# Patient Record
Sex: FEMALE
[De-identification: names, ages, dates, MRNs, and addresses within clinical notes are randomized per-mention and may not be internally consistent; named-entity substitution may affect disease eponyms.]

---

## 2017-12-19 ENCOUNTER — HOSPITAL ENCOUNTER (INPATIENT)
Dept: HOSPITAL 14 - H.OPSURG | Age: 82
LOS: 2 days | Discharge: NURSING, REHAB OR LONG-TERM CARE | DRG: 470 | End: 2017-12-21
Attending: INTERNAL MEDICINE | Admitting: INTERNAL MEDICINE
Payer: MEDICARE

## 2017-12-19 VITALS — BODY MASS INDEX: 26.6 KG/M2

## 2017-12-19 DIAGNOSIS — M16.11: Primary | ICD-10-CM

## 2017-12-19 DIAGNOSIS — R53.1: ICD-10-CM

## 2017-12-19 DIAGNOSIS — F41.9: ICD-10-CM

## 2017-12-19 DIAGNOSIS — E55.9: ICD-10-CM

## 2017-12-19 DIAGNOSIS — E78.00: ICD-10-CM

## 2017-12-19 DIAGNOSIS — E11.65: ICD-10-CM

## 2017-12-19 DIAGNOSIS — I10: ICD-10-CM

## 2017-12-19 DIAGNOSIS — M54.5: ICD-10-CM

## 2017-12-19 LAB
ERYTHROCYTE [DISTWIDTH] IN BLOOD BY AUTOMATED COUNT: 14.6 % (ref 11.5–14.5)
HCT VFR BLD CALC: 31.5 % (ref 34–47)
MCH RBC QN AUTO: 28.6 PG (ref 27–31)
MCHC RBC AUTO-ENTMCNC: 32.3 G/DL (ref 33–37)
MCV RBC AUTO: 88.5 FL (ref 81–99)
PLATELET # BLD: 271 K/UL (ref 130–400)
WBC # BLD AUTO: 12.1 K/UL (ref 4.8–10.8)

## 2017-12-19 PROCEDURE — 0SR903Z REPLACEMENT OF RIGHT HIP JOINT WITH CERAMIC SYNTHETIC SUBSTITUTE, OPEN APPROACH: ICD-10-PCS | Performed by: SPECIALIST

## 2017-12-19 RX ADMIN — INSULIN LISPRO SCH UNITS: 100 INJECTION, SUSPENSION SUBCUTANEOUS at 18:06

## 2017-12-19 RX ADMIN — INSULIN LISPRO SCH UNITS: 100 INJECTION, SOLUTION INTRAVENOUS; SUBCUTANEOUS at 22:24

## 2017-12-19 RX ADMIN — INSULIN DETEMIR SCH UNITS: 100 INJECTION, SOLUTION SUBCUTANEOUS at 22:24

## 2017-12-19 RX ADMIN — OXYCODONE HYDROCHLORIDE SCH: 10 TABLET, FILM COATED, EXTENDED RELEASE ORAL at 23:57

## 2017-12-19 NOTE — OP
PROCEDURE DATE:  12/19/2017



SURGEON:  Olga Chavez MD



ASSISTANT:  Lee Edwards PA-C



PREOPERATIVE DIAGNOSIS:  Right hip osteoarthritis.



POSTOPERATIVE DIAGNOSIS:  Right hip osteoarthritis.



PROCEDURE:  Right total hip replacement.



IMPLANTS SIZE:  [place] 54-mm Tritanium cup, a 10-degree polyethylene

liner, size 6 high-offset stem, 36-mm ceramic head with +5 neck length.



ANESTHESIA TYPE:  Spinal and sedation.



ESTIMATED BLOOD LOSS:  100 mL.



COMPLICATIONS:  None.



HISTORY:  Patient with long standing history of right hip pain refractory

of conservative management.  X-ray had shown significant loss of joint

space.  After the failure of extensive conservative management, I had

offered the patient the treatment option of total hip replacement.  I

reviewed the risk and benefits of the surgery with the patient in detail. 

The risks include, but not limited to bleeding, infection, nerve vessel

damage, continuous pain, blood loss, instability, dislocation, iatrogenic

fracture, blood clots, need for further surgery, and even death.  The

patient fully understood the risks and benefits and opted to proceed. 

Patient underwent necessary preoperative medical workup and once medically

cleared, was scheduled for the procedure.



DESCRIPTION OF PROCEDURE:

On the day of the surgery, the patient was admitted to preoperative holding

area.  A laterality sheet was completed, confirming correct operative site.

An informed consent was signed from the patient and the correct operative

hip was marked.  Patient was brought into the operating room table.  She

underwent sedation with spinal anesthesia.



Afterwards, the patient was placed on the operating room table in lateral 

decubitus position.  All the bony prominences were well padded and an 

axillary roll was also placed.  The hip was draped and prepped in the 

standard sterile manner.  Timeout was completed, confirming correct 

operative site.



We proceeded with Navigation assisted total hip replacement.  Two pins were

placed in the iliac crest to attach the antenna.  Additional checkpoint was

placed on the greater trochanter and on top of the acetabular roof.



We utilized a standard postero-lateral approach.  Using a #10 blade, a skin

incision was made.  The soft tissue dissection was taken down until the IT 

band fascia was identified incised along it's fibers.  The short external

rotators were exposed and excised with the capsule. It was tagged wit heavy

sutures preserved for a later repair.  Afterwards, the hip was dislocated

and proposed neck cut was made.



The Acetabulum was exposed using standard retractors. The remnant of torn

labrum was excised along with pulvinar. The acetabulum was reamed using

motorized reamers to the size that provide adequate depth and coverage.



Next, size 54 cup was securely fixed in to the acetabular socket in

appropriate version and inclination.  A polyethylene liner was secured in

to the acetabular cup.



The femoral canal was exposed using standard retractors. Using the box

chisel, lateral femoral neck was removed. Femoral canal was broached up to

size 6 stem, which provide a secure fit and adequate fill of femoral canal.



A high-offset trial neck was secured onto the broach.



Different trial heads with variable neck lengths were secured onto the

trial neck.  The hip was reduced and taken through extensive range of

motion to test stability, soft tissue tensioning and leg length.  It was

noted the 36-mm head with +5 neck length provide adequate stability, soft

tissue tensioning and length.



Next, size 6 stem with high-offset was securely fixed into the femoral

canal.  Then, 36-mm ceramic head with +5 neck length was secured onto the

neck of femoral stem.  Hip was reduced and taken through final range of

motion to assess for stability, soft tissue tensioning and leg length which

was found to be satisfactory.



Finally, the wound was copiously irrigated using pulse lavage solution. 

All loose bodies were removed.  The short external rotators were repaired

to greater trochanter using drill holes and heavy sutures.  IT band fascia

was tightly closed using heavy sutures and rest the wound was closed

standard manner.



Sterile dressing was applied.  Patient was transferred to stretcher. 

Post-op instructions included posterior hip precautions.



During this procedure, I was assisted by Lee Edwards PA-C, who assisted

in positioning the patient on the operating room table as well as

transferring the patient from the operating room table to the recovery room

stretcher.  In addition, Lee Edwards PA-C assisted me during the actual

operative procedure by positioning, protecting critical neurovascular

structures, exposure of the joint, and proper positioning of the implants.

The presence of Lee Edwards PA-C as my operative assistant was

medically necessary to ensure the utmost safety of the patient in the pre,

intra-, and post-operative periods.





__________________________________________

Imran Scott, MD



DD:  12/19/2017 11:09:45

DT:  12/19/2017 12:22:48

Job # [id number]

## 2017-12-19 NOTE — PCM.SURG1
Surgeon's Initial Post Op Note





- Surgeon's Notes


Surgeon: Olga Chavez MD 


Assistant: Serge Jang MD; Lee Edwards PA-C 


Type of Anesthesia: General Endo, Spinal


Pre-Operative Diagnosis: Right Hip severe Osteoarthritis


Operative Findings: see op report


Post-Operative Diagnosis: same as pre-op dx


Operation Performed: Right total hip replacement


Specimen/Specimens Removed: right hip femoral head, soft tissue


Estimated Blood Loss: EBL {In ML}: 150


Date of Surgery/Procedure: 12/19/17


Time of Surgery/Procedure: 09:30

## 2017-12-19 NOTE — CP.PCM.HP
History of Present Illness





- History of Present Illness


History of Present Illness: 





S/P R THR.





81 y/o F, Multiple chronic medical conditions with scheduled admission to [facility] for OR on DOA 17, had R THR 2nd to O/A , Patient had constant 

moderate and at times severe pain in that site that has been experienced for 

years and gradually increased,  Pt was taking narcotics pain control, anti 

inflammatory drugs with some relief. Pt had CT R Hip at [facility] on 2017, showing degenerative changes R hip.





Worsening symptoms:  Uncontrolled DM, weakness, anxiety.





Aggravated factor:  Difficulty to walk.





Pt denied:  Fever, chills, n/v/d, abdominal pain, CP, SOB, palpitations, 

numbness, sick contact.























Present on Admission





- Present on Admission


Any Indicators Present on Admission: Yes


History of Uncontrolled Diabetes: Yes





Review of Systems





- Constitutional


Constitutional: Other (negative)





- EENT


Eyes: Other (negative)


Ears: Other (negative)


Nose/Mouth/Throat: Other (negative)





- Cardiovascular


Cardiovascular: Other (negative)





- Respiratory


Respiratory: Other (negative)





- Gastrointestinal


Gastrointestinal: Other (negative)





- Genitourinary


Genitourinary: Other (negative)





- Musculoskeletal


Musculoskeletal: Arthralgias, Back Pain


Additional comments: 








Left Hip pain





- Integumentary


Integumentary: Other (negative)





- Neurological


Neurological: Other (negative)





- Psychiatric


Psychiatric: Anxiety





- Endocrine


Endocrine: Other (negative)





- Hematologic/Lymphatic


Hematologic: Other (negative)





Past Patient History





- Infectious Disease


Hx of Infectious Diseases: None





- Past Medical History & Family History


Past Medical History?: Yes


Pertinent Family History: 





one  son OA with  Hx of  TKR





- Past Social History


Smoking Status: Never Smoked


Alcohol: None


Drugs: Denies


Home Situation {Lives}: With Family





- CARDIAC


Hx Cardiac Disorders: Yes


Hx Hypercholesterolemia: Yes (Patient  can not  take  Statins due  to myalgias.)


Hx Hypertension: Yes





- PULMONARY


Hx Respiratory Disorders: No


Other/Comment: Hx  Allergic  Rhinitis





- NEUROLOGICAL


HX Cerebrovascular Accident: Yes





- HEENT


Hx HEENT Problems: Yes


Hx Cataracts: Yes





- RENAL


Hx Chronic Kidney Disease: No


Other/Comment: chronic urinary infections





- ENDOCRINE/METABOLIC


Hx Endocrine Disorders: Yes


Hx Diabetes Mellitus Type 2: Yes





- HEMATOLOGICAL/ONCOLOGICAL


Hx Blood Disorders: No


Other/Comment: Arterial  embolism L Subclavian L Braquial 10-18-16 ( Patient 

took  Coumadin for   6 months ,then DC)





- INTEGUMENTARY


Hx Dermatological Problems: No





- MUSCULOSKELETAL/RHEUMATOLOGICAL


Hx Musculoskeletal Disorders: Yes


Hx Arthritis: Yes


Hx Back Pain: Yes


Hx Osteoarthritis: Yes


Hx Unsteady Gait: Yes


Other/Comment: R HIP PAIN . MUSCLE WEAKNESS .LIMIT JOINT MOTION





- GASTROINTESTINAL


Hx Gastrointestinal Disorders: Yes


Hx Gastritis: Yes (Hiatus  Hernia)





- GENITOURINARY/GYNECOLOGICAL


Hx Genitourinary Disorders: Yes


Hx Urinary Tract Infection: Yes





- PSYCHIATRIC


Hx Psychophysiologic Disorder: Yes


Hx Anxiety: Yes


Hx Emotional Abuse: No


Hx Physical Abuse: No


Hx Substance Use: No





- SURGICAL HISTORY


Hx Surgeries: Yes


Hx  Section: Yes (X2)


Hx Cholecystectomy: Yes


Other/Comment: ARTERIAL EMBOLECTOMY LEFT  BRAQUIAL 2016 ,  BLADDER 

SURGERY  , REMOVAL CYST RIGHT KNEE





- ANESTHESIA


Hx Anesthesia: Yes


Hx Anesthesia Reactions: No


Hx Malignant Hyperthermia: No


Has any member of the family had a problem w/ anesthesia?: No





Meds


Home Medications: 


 Home Medication List











 Medication  Instructions  Recorded  Confirmed  Type


 


Acetaminophen [Tylenol 325mg tab] 325 mg PO Q4 PRN  tab 17  Rx


 


Celecoxib [celeBREX] 100 mg PO Q12  cap 17  Rx


 


Enoxaparin [Lovenox] 40 mg SC DAILY  syr 17  Rx


 


oxyCODONE [oxyCONTIN Extended 10 mg PO Q12 #10 tab 17  Rx





Release Tab]    


 


oxyCODONE/Acetaminophen [Percocet 1 tab PO Q4 PRN #10 tab 17  Rx





5/325 mg Tab]    











Allergies/Adverse Reactions: 


 Allergies











Allergy/AdvReac Type Severity Reaction Status Date / Time


 


No Known Allergies Allergy   Verified 17 14:39














Physical Exam





- Constitutional


Appears: No Acute Distress





- Head Exam


Head Exam: NORMAL INSPECTION


Additional comments: 





L hand  weakness





- Eye Exam


Eye Exam: PERRL





- ENT Exam


ENT Exam: Normal Exam





- Neck Exam


Neck exam: Positive for: Normal Inspection





- Respiratory Exam


Respiratory Exam: NORMAL BREATHING PATTERN





- Cardiovascular Exam


Cardiovascular Exam: REGULAR RHYTHM, Systolic Murmur (1/6 LSB)





- GI/Abdominal Exam


GI & Abdominal Exam: Normal Bowel Sounds, Soft


Additional comments: 





Lower midline surgical scar healed, small midline supraumbilical hernia





- Extremities Exam


Additional comments: 





R hip dressing in place.,neuromuscular  status  distal good , area  of 

induration R hand  palm with tenderness , L hand  weakness





- Back Exam


Back exam: tenderness (L-S)





- Neurological Exam


Neurological exam: Alert, CN II-XII Intact, Oriented x3


Additional comments: 





neuromuscular status distal  RLE good, weakness l hand





- Psychiatric Exam


Psychiatric exam: Anxious





- Skin


Skin Exam: Warm





Results





- Vital Signs


Recent Vital Signs: 





 Last Vital Signs











Temp  97.4 F L  17 15:19


 


Pulse  70   17 15:00


 


Resp  20   17 15:19


 


BP  122/68   17 15:19


 


Pulse Ox  92 L  17 15:19








reviewed J.P.





- Labs


Result Diagrams: 


 17 05:35





 17 07:30


Labs: 





 Laboratory Results - last 24 hr











  17





  07:28 07:28 08:13


 


POC Glucose (mg/dL)    208 H


 


Blood Type  Cancelled  O POSITIVE 


 


Antibody Screen  Cancelled  Negative 


 


Crossmatch   See Detail 


 


BBK History Checked  Cancelled  Patient has bt 














  17





  12:40 15:41


 


POC Glucose (mg/dL)  257 H  260 H


 


Blood Type  


 


Antibody Screen  


 


Crossmatch  


 


BBK History Checked  








reviewed J.P.





Assessment & Plan


(1) Status post total hip replacement, right


Status: Acute   





(2) Uncontrolled type II diabetes mellitus


Status: Acute   Priority: High   





(3) Lumbago


Status: Chronic   Priority: Medium   





(4) HTN (hypertension)


Status: Chronic   Priority: Medium   





(5) History of arterial embolism


Status: Chronic   


Comment: Left  upper  extremity   





- Assessment and Plan (Free Text)


Plan: 


F/U Abd/Pelv X-Ray.  Continue Oxycodone, Humalog mix 75/25, Levemir and rest of 

medications, PT,OT eval.





- Date & Time


Date: 17


Time: 12:40

## 2017-12-20 LAB
BASOPHILS # BLD AUTO: 0 K/UL (ref 0–0.2)
BASOPHILS NFR BLD: 0.4 % (ref 0–2)
BUN SERPL-MCNC: 31 MG/DL (ref 7–17)
CALCIUM SERPL-MCNC: 7.6 MG/DL (ref 8.4–10.2)
CHLORIDE SERPL-SCNC: 104 MMOL/L (ref 98–107)
CO2 SERPL-SCNC: 24 MMOL/L (ref 22–30)
EOSINOPHIL # BLD AUTO: 0.1 K/UL (ref 0–0.7)
EOSINOPHIL NFR BLD: 1.1 % (ref 0–4)
ERYTHROCYTE [DISTWIDTH] IN BLOOD BY AUTOMATED COUNT: 14.6 % (ref 11.5–14.5)
GLUCOSE SERPL-MCNC: 272 MG/DL (ref 65–105)
HCT VFR BLD CALC: 29.3 % (ref 34–47)
LYMPHOCYTES # BLD AUTO: 1.5 K/UL (ref 1–4.3)
LYMPHOCYTES NFR BLD AUTO: 22.5 % (ref 20–40)
MCH RBC QN AUTO: 28.2 PG (ref 27–31)
MCHC RBC AUTO-ENTMCNC: 31.9 G/DL (ref 33–37)
MCV RBC AUTO: 88.6 FL (ref 81–99)
MONOCYTES # BLD: 0.6 K/UL (ref 0–0.8)
MONOCYTES NFR BLD: 9.9 % (ref 0–10)
NEUTROPHILS # BLD: 4.3 K/UL (ref 1.8–7)
NEUTROPHILS NFR BLD AUTO: 66.1 % (ref 50–75)
NRBC BLD AUTO-RTO: 0 % (ref 0–0)
PLATELET # BLD: 222 K/UL (ref 130–400)
PMV BLD AUTO: 7.5 FL (ref 7.2–11.7)
POTASSIUM SERPL-SCNC: 4.6 MMOL/L (ref 3.6–5)
SODIUM SERPL-SCNC: 136 MMOL/L (ref 132–148)
WBC # BLD AUTO: 6.5 K/UL (ref 4.8–10.8)

## 2017-12-20 RX ADMIN — INSULIN LISPRO SCH UNITS: 100 INJECTION, SOLUTION INTRAVENOUS; SUBCUTANEOUS at 16:21

## 2017-12-20 RX ADMIN — INSULIN LISPRO SCH UNITS: 100 INJECTION, SUSPENSION SUBCUTANEOUS at 12:10

## 2017-12-20 RX ADMIN — ENOXAPARIN SODIUM SCH MG: 40 INJECTION SUBCUTANEOUS at 21:39

## 2017-12-20 RX ADMIN — INSULIN LISPRO SCH: 100 INJECTION, SOLUTION INTRAVENOUS; SUBCUTANEOUS at 21:25

## 2017-12-20 RX ADMIN — INSULIN LISPRO SCH UNITS: 100 INJECTION, SOLUTION INTRAVENOUS; SUBCUTANEOUS at 11:01

## 2017-12-20 RX ADMIN — INSULIN DETEMIR SCH UNITS: 100 INJECTION, SOLUTION SUBCUTANEOUS at 21:38

## 2017-12-20 RX ADMIN — OXYCODONE HYDROCHLORIDE SCH MG: 10 TABLET, FILM COATED, EXTENDED RELEASE ORAL at 21:31

## 2017-12-20 RX ADMIN — INSULIN LISPRO SCH UNITS: 100 INJECTION, SUSPENSION SUBCUTANEOUS at 08:50

## 2017-12-20 RX ADMIN — INSULIN LISPRO SCH UNITS: 100 INJECTION, SOLUTION INTRAVENOUS; SUBCUTANEOUS at 07:50

## 2017-12-20 RX ADMIN — INSULIN LISPRO SCH UNITS: 100 INJECTION, SUSPENSION SUBCUTANEOUS at 16:37

## 2017-12-20 RX ADMIN — OXYCODONE HYDROCHLORIDE SCH MG: 10 TABLET, FILM COATED, EXTENDED RELEASE ORAL at 08:58

## 2017-12-20 NOTE — CP.PCM.PN
Subjective





- Date & Time of Evaluation


Date of Evaluation: 12/20/17


Time of Evaluation: 11:20





- Subjective


Subjective: 





F/U  R THR


PO D # 1 : Mild  Post -Surgical Pain, R Hip, Patient  on pain medication





Objective





- Vital Signs/Intake and Output


Vital Signs (last 24 hours): 


 











Temp Pulse Resp BP Pulse Ox


 


 97.5 F L  97 H  20   137/68   100 


 


 12/20/17 09:00  12/20/17 11:17  12/20/17 09:00  12/20/17 09:00  12/20/17 11:17











- Medications


Medications: 


 Current Medications





Acetaminophen (Tylenol 325mg Tab)  325 mg PO Q4 PRN


   PRN Reason: pain1-3


Alprazolam (Xanax)  0.5 mg PO TID formerly [facility]


   Last Admin: 12/20/17 08:58 Dose:  0.5 mg


Aspirin (Ecotrin)  81 mg PO DAILY formerly [facility]


   Last Admin: 12/20/17 08:49 Dose:  81 mg


Celecoxib (Celebrex)  100 mg PO Q12 formerly [facility]


   Last Admin: 12/20/17 08:49 Dose:  100 mg


Enoxaparin Sodium (Lovenox)  40 mg SC DAILY formerly [facility]


   PRN Reason: Protocol


Gabapentin (Neurontin)  100 mg PO BID formerly [facility]


   Last Admin: 12/20/17 08:49 Dose:  100 mg


Home Med (Cholecalciferol (Vitamin D3) [Vitamin D3])  50,000 units PO ASDIR formerly [facility]


Sodium Chloride (Sodium Chloride 0.45%)  1,000 mls @ 60 mls/hr IV .[id number] formerly [facility]


   Stop: 12/21/17 13:57


Insulin Detemir (Levemir)  12 units SC HS formerly [facility]


   Last Admin: 12/19/17 22:24 Dose:  12 units


Insulin Human Lispro (Humalog)  0 units SC ACHS formerly [facility]


   PRN Reason: Protocol


   Last Admin: 12/20/17 11:01 Dose:  12 units


Insulin Lispro Protam/Lispro Human (Humalog Mix 75/25)  15 units SC TID formerly [facility]


   Last Admin: 12/20/17 12:10 Dose:  15 units


Ketorolac Tromethamine (Toradol)  15 mg IM Q8 formerly [facility]


   Stop: 12/21/17 09:01


   Last Admin: 12/20/17 08:51 Dose:  15 mg


Oxycodone HCl (Oxycontin Extended Release Tab)  10 mg PO Q12 formerly [facility]


   Stop: 01/02/18 21:01


   Last Admin: 12/20/17 08:58 Dose:  10 mg


Oxycodone/Acetaminophen (Percocet 5/325 Mg Tab)  1 tab PO Q4 PRN


   PRN Reason: pain4-6


   Stop: 12/22/17 11:51











- Labs


Labs: 


 





 12/20/17 06:05 





 12/20/17 06:05 











- Constitutional


Appears: No Acute Distress





- Head Exam


Head Exam: NORMAL INSPECTION





- Eye Exam


Eye Exam: PERRL





- ENT Exam


ENT Exam: Normal Exam





- Neck Exam


Neck Exam: Normal Inspection





- Respiratory Exam


Respiratory Exam: NORMAL BREATHING PATTERN





- Cardiovascular Exam


Cardiovascular Exam: REGULAR RHYTHM





- GI/Abdominal Exam


GI & Abdominal Exam: Soft, Normal Bowel Sounds


Additional comments: 





Lower midline surgical scar healed.





- Extremities Exam


Additional comments: 


R hip dressing in place , mild  tenderness ,  neuromuscular status  distal good 

L hand  area of induration R hand  palm with tenderness





- Back Exam


Back Exam: tenderness (mild L-S)





- Neurological Exam


Neurological Exam: Alert, Oriented x3


Additional comments: 


 neuromuscular  status RLE good, weakness  L hand





- Psychiatric Exam


Psychiatric exam: Anxious





- Skin


Skin Exam: Warm





Assessment and Plan


(1) Status post total hip replacement, left


Status: Acute   





(2) Uncontrolled type II diabetes mellitus


Status: Acute   





(3) HTN (hypertension)


Status: Chronic   





(4) Lumbago


Status: Chronic   





(5) Vitamin D deficiency


Status: Acute   





(6) History of arterial embolism


Status: Chronic   





- Assessment and Plan (Free Text)


Plan: 





Continue current  treatment  , Oxycodone , Percocet , Insulin and  rest  of  

treatment , pain and BS control , PT

## 2017-12-20 NOTE — CP.PCM.PN
Subjective





- Date & Time of Evaluation


Date of Evaluation: 12/20/17


Time of Evaluation: 08:30





- Subjective


Subjective: 





S/P RTHR POD#1


Pt seen and examined at bedside, comfortable in bed 


Pt c/o mild right hip pain


Pt denies any SOB, chest pain, N/V/D, numbness/tingling RLE








Objective





- Vital Signs/Intake and Output


Vital Signs (last 24 hours): 


 











Temp Pulse Resp BP Pulse Ox


 


 97.5 F L  84   18   110/60   100 


 


 12/20/17 07:42  12/20/17 07:42  12/20/17 07:42  12/20/17 07:42  12/20/17 07:42











- Medications


Medications: 


 Current Medications





Acetaminophen (Tylenol 325mg Tab)  325 mg PO Q4 PRN


   PRN Reason: pain1-3


Alprazolam (Xanax)  0.5 mg PO TID [facility]


   Last Admin: 12/20/17 08:58 Dose:  0.5 mg


Aspirin (Ecotrin)  81 mg PO DAILY [facility]


   Last Admin: 12/20/17 08:49 Dose:  81 mg


Celecoxib (Celebrex)  100 mg PO Q12 [facility]


   Last Admin: 12/20/17 08:49 Dose:  100 mg


Enoxaparin Sodium (Lovenox)  40 mg SC DAILY [facility]


   PRN Reason: Protocol


Gabapentin (Neurontin)  100 mg PO BID [facility]


   Last Admin: 12/20/17 08:49 Dose:  100 mg


Home Med (Cholecalciferol (Vitamin D3) [Vitamin D3])  50,000 units PO ASDIR [facility]


Lactated Ringer's (Lactated Ringer's)  1,000 mls @ 100 mls/hr IV .Q10H [facility]


   Last Admin: 12/19/17 21:34 Dose:  100 mls/hr


Insulin Detemir (Levemir)  12 units SC HS [facility]


   Last Admin: 12/19/17 22:24 Dose:  12 units


Insulin Human Lispro (Humalog)  0 units SC ACHS [facility]


   PRN Reason: Protocol


   Last Admin: 12/20/17 07:50 Dose:  8 units


Insulin Lispro Protam/Lispro Human (Humalog Mix 75/25)  15 units SC TID [facility]


   Last Admin: 12/20/17 08:50 Dose:  15 units


Ketorolac Tromethamine (Toradol)  15 mg IM Q8 [facility]


   Stop: 12/21/17 09:01


   Last Admin: 12/20/17 08:51 Dose:  15 mg


Oxycodone HCl (Oxycontin Extended Release Tab)  10 mg PO Q12 [facility]


   Stop: 01/02/18 21:01


   Last Admin: 12/20/17 08:58 Dose:  10 mg


Oxycodone/Acetaminophen (Percocet 5/325 Mg Tab)  1 tab PO Q4 PRN


   PRN Reason: pain4-6


   Stop: 12/22/17 11:51











- Labs


Labs: 


 





 12/20/17 06:05 





 12/20/17 06:05 











- Constitutional


Appears: Well, No Acute Distress





- Respiratory Exam


Respiratory Exam: Clear to Ausculation Bilateral, NORMAL BREATHING PATTERN





- Cardiovascular Exam


Cardiovascular Exam: REGULAR RHYTHM, RRR





- Extremities Exam


Additional comments: 





Right hip:


dressing C/D/I


Calves soft and nontender b/l


N/V intact distally


No foot drop


Distal pulses wnl 





Assessment and Plan





- Assessment and Plan (Free Text)


Assessment: 





81 yo F s/p RTHR POD#1


Plan: 





Pain Control


DVT ppx


Incentive Spirometer


PT/OT WBAT 


F/U labs 


Continue current managenment

## 2017-12-20 NOTE — RAD
PROCEDURE:  Right Hip Radiographs.



HISTORY:

s/p RTHR  



COMPARISON:

Right hip radiographs dated 12/11/2017.



FINDINGS:

The patient is status post total right hip arthroplasty with 

prosthetic components seen in good alignment. A small amount of 

expected air and fluid is seen adjacent to the surgical bed.  

Surgical staples are present superficially. There is stable narrowing 

of the left hip with subchondral sclerosis, cystic change and 

osteophytosis. The sacroiliac joints are degenerative. Evaluation the 

sacrum is limited by overlying bowel gas and stool. The ilioischial 

and iliopectineal lines are smooth.  The symphysis pubis is mildly 

degenerative. A calcified uterine fibroid is seen centrally. 



IMPRESSION:

Status post right hip arthroplasty.

## 2017-12-20 NOTE — PQF GENQUE
***** This form is a permanent part of the medical record*****



12/20/17 Dr. Saldaña,



H&P with [facility] template includes DM I and DM II. Accuchecks running [id number]. 
Treated with insulin.



Would you please clarify if this is DM I or DM II and whether it is controlled 
or uncontrolled with Hyperglycemia.

          

Clarification of your documentation is requested to better reflect the severity 
of illness and intensity of treatment of your patient.  



Indicators present      



[]  Specify: []

         



[]  Specify: []         

 



[]  Specify: []         





[]  Specify: []         





Location in the medical record that reflects the above clinical findings:  []

 



Treatment Provided:  []

  

PHYSICIAN'S RESPONSE





Based on your medical judgment of the clinical indicators outlined above please 
clarify the following:



[]  Practitioner response 



[]  If unable to determine, please check the box, sign and date.  





Present On Admission (POA) Indicator:





[] Present at the time of admission 



[] Not present at the time of admission



[] Clinically Undetermined









In responding to this query, please exercise your independent professional 
judgment.  The fact that a question is asked does not imply that any particular 
answer is desired or expected.  Thank you for your clarification on this 
documentation.



If you have any questions please call:[phone]

* Thank you,

   Anamaria Delvalle RN

   [facility]

## 2017-12-21 ENCOUNTER — HOSPITAL ENCOUNTER (INPATIENT)
Dept: HOSPITAL 14 - H.TCU | Age: 82
LOS: 13 days | Discharge: NURSING, REHAB OR LONG-TERM CARE | DRG: 560 | End: 2018-01-03
Attending: INTERNAL MEDICINE | Admitting: INTERNAL MEDICINE
Payer: COMMERCIAL

## 2017-12-21 VITALS
DIASTOLIC BLOOD PRESSURE: 58 MMHG | SYSTOLIC BLOOD PRESSURE: 100 MMHG | RESPIRATION RATE: 20 BRPM | OXYGEN SATURATION: 98 % | TEMPERATURE: 98.2 F | HEART RATE: 80 BPM

## 2017-12-21 VITALS — BODY MASS INDEX: 26.6 KG/M2

## 2017-12-21 DIAGNOSIS — Z47.1: Primary | ICD-10-CM

## 2017-12-21 DIAGNOSIS — T40.605A: ICD-10-CM

## 2017-12-21 DIAGNOSIS — R41.0: ICD-10-CM

## 2017-12-21 DIAGNOSIS — E86.9: ICD-10-CM

## 2017-12-21 DIAGNOSIS — E11.65: ICD-10-CM

## 2017-12-21 DIAGNOSIS — E66.3: ICD-10-CM

## 2017-12-21 DIAGNOSIS — E55.9: ICD-10-CM

## 2017-12-21 DIAGNOSIS — I69.398: ICD-10-CM

## 2017-12-21 DIAGNOSIS — R53.1: ICD-10-CM

## 2017-12-21 DIAGNOSIS — I10: ICD-10-CM

## 2017-12-21 DIAGNOSIS — D62: ICD-10-CM

## 2017-12-21 DIAGNOSIS — N17.9: ICD-10-CM

## 2017-12-21 DIAGNOSIS — Z96.641: ICD-10-CM

## 2017-12-21 LAB
BASOPHILS # BLD AUTO: 0 K/UL (ref 0–0.2)
BASOPHILS NFR BLD: 0.3 % (ref 0–2)
BUN SERPL-MCNC: 44 MG/DL (ref 7–17)
CALCIUM SERPL-MCNC: 7.6 MG/DL (ref 8.4–10.2)
CHLORIDE SERPL-SCNC: 100 MMOL/L (ref 98–107)
CO2 SERPL-SCNC: 22 MMOL/L (ref 22–30)
EOSINOPHIL # BLD AUTO: 0 K/UL (ref 0–0.7)
EOSINOPHIL NFR BLD: 0.1 % (ref 0–4)
ERYTHROCYTE [DISTWIDTH] IN BLOOD BY AUTOMATED COUNT: 14.8 % (ref 11.5–14.5)
GLUCOSE SERPL-MCNC: 272 MG/DL (ref 65–105)
HCT VFR BLD CALC: 32.2 % (ref 34–47)
LYMPHOCYTES # BLD AUTO: 1.2 K/UL (ref 1–4.3)
LYMPHOCYTES NFR BLD AUTO: 10.7 % (ref 20–40)
MCH RBC QN AUTO: 27.9 PG (ref 27–31)
MCHC RBC AUTO-ENTMCNC: 31.3 G/DL (ref 33–37)
MCV RBC AUTO: 89.3 FL (ref 81–99)
MONOCYTES # BLD: 0.8 K/UL (ref 0–0.8)
MONOCYTES NFR BLD: 6.9 % (ref 0–10)
NEUTROPHILS # BLD: 9 K/UL (ref 1.8–7)
NEUTROPHILS NFR BLD AUTO: 82 % (ref 50–75)
NRBC BLD AUTO-RTO: 0 % (ref 0–0)
PLATELET # BLD: 203 K/UL (ref 130–400)
PMV BLD AUTO: 8.5 FL (ref 7.2–11.7)
POTASSIUM SERPL-SCNC: 4.9 MMOL/L (ref 3.6–5)
SODIUM SERPL-SCNC: 132 MMOL/L (ref 132–148)
WBC # BLD AUTO: 11 K/UL (ref 4.8–10.8)

## 2017-12-21 PROCEDURE — F08Z0FZ BATHING/SHOWERING TECHNIQUES TREATMENT USING ASSISTIVE, ADAPTIVE, SUPPORTIVE OR PROTECTIVE EQUIPMENT: ICD-10-PCS | Performed by: INTERNAL MEDICINE

## 2017-12-21 PROCEDURE — F07Z5FZ BED MOBILITY TREATMENT USING ASSISTIVE, ADAPTIVE, SUPPORTIVE OR PROTECTIVE EQUIPMENT: ICD-10-PCS | Performed by: INTERNAL MEDICINE

## 2017-12-21 PROCEDURE — F07L6ZZ THERAPEUTIC EXERCISE TREATMENT OF MUSCULOSKELETAL SYSTEM - LOWER BACK / LOWER EXTREMITY: ICD-10-PCS | Performed by: INTERNAL MEDICINE

## 2017-12-21 PROCEDURE — F07Z9FZ GAIT TRAINING/FUNCTIONAL AMBULATION TREATMENT USING ASSISTIVE, ADAPTIVE, SUPPORTIVE OR PROTECTIVE EQUIPMENT: ICD-10-PCS | Performed by: INTERNAL MEDICINE

## 2017-12-21 PROCEDURE — F08Z1FZ DRESSING TECHNIQUES TREATMENT USING ASSISTIVE, ADAPTIVE, SUPPORTIVE OR PROTECTIVE EQUIPMENT: ICD-10-PCS | Performed by: INTERNAL MEDICINE

## 2017-12-21 RX ADMIN — ERGOCALCIFEROL SCH: 1.25 CAPSULE ORAL at 16:42

## 2017-12-21 RX ADMIN — ENOXAPARIN SODIUM SCH MG: 40 INJECTION SUBCUTANEOUS at 09:18

## 2017-12-21 RX ADMIN — INSULIN LISPRO SCH UNITS: 100 INJECTION, SUSPENSION SUBCUTANEOUS at 13:00

## 2017-12-21 RX ADMIN — INSULIN LISPRO SCH UNITS: 100 INJECTION, SOLUTION INTRAVENOUS; SUBCUTANEOUS at 12:00

## 2017-12-21 RX ADMIN — INSULIN LISPRO SCH UNITS: 100 INJECTION, SUSPENSION SUBCUTANEOUS at 09:15

## 2017-12-21 RX ADMIN — OXYCODONE HYDROCHLORIDE SCH MG: 10 TABLET, FILM COATED, EXTENDED RELEASE ORAL at 09:34

## 2017-12-21 RX ADMIN — INSULIN LISPRO SCH UNITS: 100 INJECTION, SOLUTION INTRAVENOUS; SUBCUTANEOUS at 09:14

## 2017-12-21 RX ADMIN — OXYCODONE HYDROCHLORIDE SCH: 10 TABLET, FILM COATED, EXTENDED RELEASE ORAL at 21:40

## 2017-12-21 RX ADMIN — INSULIN LISPRO SCH UNITS: 100 INJECTION, SUSPENSION SUBCUTANEOUS at 16:52

## 2017-12-21 RX ADMIN — INSULIN LISPRO SCH UNITS: 100 INJECTION, SUSPENSION SUBCUTANEOUS at 12:09

## 2017-12-21 NOTE — CP.PCM.PN
Subjective





- Date & Time of Evaluation


Date of Evaluation: 12/21/17


Time of Evaluation: 12:20





- Subjective


Subjective: 





F/U  R THR.


No AD , minimal pain  R Hip with pain medication





Objective





- Vital Signs/Intake and Output


Vital Signs (last 24 hours): 


 











Temp Pulse Resp BP Pulse Ox


 


 98.2 F   80   20   100/58 L  98 


 


 12/21/17 07:47  12/21/17 07:47  12/21/17 07:47  12/21/17 07:47  12/21/17 07:47











- Labs


Labs: 


 





 12/21/17 05:35 





 12/21/17 07:30 











- Constitutional


Appears: No Acute Distress





- Head Exam


Head Exam: NORMAL INSPECTION





- Eye Exam


Eye Exam: PERRL





- ENT Exam


ENT Exam: Normal Exam





- Neck Exam


Neck Exam: Normal Inspection





- Respiratory Exam


Respiratory Exam: NORMAL BREATHING PATTERN





- Cardiovascular Exam


Cardiovascular Exam: REGULAR RHYTHM





- GI/Abdominal Exam


GI & Abdominal Exam: Soft, Normal Bowel Sounds


Additional comments: 





Lower midline surgical scar healed.





- Extremities Exam


Additional comments: 





R hip dressing in place, neuromuscular  distal status  good , area  of  

induration R hand  palm with tenderness , L hand  weakness





- Back Exam


Back Exam: tenderness (L-S)





- Neurological Exam


Neurological Exam: Alert, Oriented x3


Additional comments: 





neurological satus distal  RLE good, weakness L hand





- Psychiatric Exam


Psychiatric exam: Anxious





- Skin


Skin Exam: Warm





Assessment and Plan


(1) Status post total hip replacement, right


Status: Acute   





(2) Uncontrolled type II diabetes mellitus


Status: Acute   





(3) HTN (hypertension)


Status: Chronic   





(4) Lumbago


Status: Chronic   





(5) Vitamin D deficiency


Status: Acute   





(6) History of arterial embolism


Status: Chronic   





- Assessment and Plan (Free Text)


Plan: 





Improved , stable  to be DD to TCU for continue  PT, see inst/med sheet, I will 

follow  Patient in TCU.

## 2017-12-22 RX ADMIN — OXYCODONE HYDROCHLORIDE SCH: 10 TABLET, FILM COATED, EXTENDED RELEASE ORAL at 09:02

## 2017-12-22 RX ADMIN — INSULIN LISPRO SCH UNITS: 100 INJECTION, SUSPENSION SUBCUTANEOUS at 16:15

## 2017-12-22 RX ADMIN — ENOXAPARIN SODIUM SCH MG: 40 INJECTION SUBCUTANEOUS at 09:01

## 2017-12-22 RX ADMIN — INSULIN LISPRO SCH UNITS: 100 INJECTION, SUSPENSION SUBCUTANEOUS at 10:00

## 2017-12-22 RX ADMIN — INSULIN LISPRO SCH UNITS: 100 INJECTION, SUSPENSION SUBCUTANEOUS at 13:00

## 2017-12-22 RX ADMIN — INSULIN DETEMIR SCH UNITS: 100 INJECTION, SOLUTION SUBCUTANEOUS at 21:52

## 2017-12-22 NOTE — CP.PCM.HP
History of Present Illness





- History of Present Illness


History of Present Illness: 


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  

CVA with LUE weakness had recent right total hip replacement ad transferred to 

TCU for physical therapy. At present patient feeling well, denies any pain or 

discomfort , denies chest pain SOB. She appears slightly confused .








Allergies : NKDA


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 

weakness


Medications : Novolog, Benecar, ASa, Xanax, Lovenox,Levemir


Surgery ; bladder surgery , right hip replacement , cholecystectomy, cyst 

removal


Family history ; son has OA and hip surgery


Social history ; Lives with daughter in [facility], denies smoking ,ETOH or 

drug abuse





PMD ; Dr. Piper Oglesby ; 14 point review of system negative except above











 











Present on Admission





- Present on Admission


Any Indicators Present on Admission: No


History of DVT/PE: Yes





Review of Systems





- Review of Systems


All systems: reviewed and no additional remarkable complaints except





Past Patient History





- Infectious Disease


Hx of Infectious Diseases: None





- Tetanus Immunizations


Tetanus Immunization: Unknown





- Past Medical History & Family History


Past Medical History?: Yes


Past Family History: Reviewed and not pertinent





- Past Social History


Smoking Status: Never Smoked


Chewing Tobacco Use: No


Cigar Use: No


Alcohol: None


Drugs: Denies


Home Situation {Lives}: With Family


Domestic Violence: Negative





- CARDIAC


Hx Cardiac Disorders: Yes


Hx Hypercholesterolemia: Yes (Patient  can not  take  Statins due  to myalgias.)


Hx Hypertension: Yes





- PULMONARY


Hx Respiratory Disorders: No


Other/Comment: Hx  Allergic  Rhinitis





- NEUROLOGICAL


HX Cerebrovascular Accident: Yes





- HEENT


Hx HEENT Problems: Yes


Hx Cataracts: Yes





- RENAL


Hx Chronic Kidney Disease: No


Other/Comment: chronic urinary infections





- ENDOCRINE/METABOLIC


Hx Endocrine Disorders: Yes


Hx Diabetes Mellitus Type 2: Yes





- HEMATOLOGICAL/ONCOLOGICAL


Hx Blood Disorders: No


Other/Comment: Arterial  embolism L Subclavian L Braquial 10-18-16 ( Patient 

took  Coumadin for   6 months ,then DC)





- INTEGUMENTARY


Hx Dermatological Problems: No





- MUSCULOSKELETAL/RHEUMATOLOGICAL


Hx Musculoskeletal Disorders: Yes


Hx Arthritis: Yes


Hx Back Pain: Yes


Hx Falls: No


Hx Osteoarthritis: Yes


Hx Unsteady Gait: Yes


Other/Comment: R HIP PAIN . MUSCLE WEAKNESS .LIMIT JOINT MOTION





- GASTROINTESTINAL


Hx Gastrointestinal Disorders: Yes


Hx Gastritis: Yes (Hiatus  Hernia)





- GENITOURINARY/GYNECOLOGICAL


Hx Genitourinary Disorders: Yes


Hx Urinary Tract Infection: Yes





- PSYCHIATRIC


Hx Psychophysiologic Disorder: Yes


Hx Anxiety: Yes


Hx Emotional Abuse: No


Hx Physical Abuse: No


Hx Substance Use: No





- SURGICAL HISTORY


Hx  Section: Yes


Hx Joint Replacement: Yes (right total hip replacement 17)





- ANESTHESIA


Hx Anesthesia: Yes


Hx Anesthesia Reactions: No


Hx Malignant Hyperthermia: No





Meds


Allergies/Adverse Reactions: 


 Allergies











Allergy/AdvReac Type Severity Reaction Status Date / Time


 


No Known Allergies Allergy   Verified 17 14:39














Physical Exam





- Constitutional


Appears: Non-toxic, No Acute Distress, Confused, Other (obese )





- Head Exam


Head Exam: ATRAUMATIC, NORMAL INSPECTION, NORMOCEPHALIC





- Eye Exam


Eye Exam: PERRL





- ENT Exam


ENT Exam: Mucous Membranes Moist, Normal Exam





- Neck Exam


Neck exam: Positive for: Full Rom, Normal Inspection





- Respiratory Exam


Respiratory Exam: Clear to Auscultation Bilateral, NORMAL BREATHING PATTERN.  

absent: Rales, Rhonchi, Wheezes





- Cardiovascular Exam


Cardiovascular Exam: REGULAR RHYTHM, RRR, +S1, +S2.  absent: JVD





- GI/Abdominal Exam


GI & Abdominal Exam: Normal Bowel Sounds, Soft.  absent: Distended, Guarding, 

Rebound, Tenderness





- Rectal Exam


Rectal Exam: Deferred





- Extremities Exam


Extremities exam: Positive for: full ROM, normal capillary refill, normal 

inspection, pedal pulses present.  Negative for: pedal edema


Additional comments: 





right hip surgical incision with dressing in place





- Neurological Exam


Neurological exam: Alert, CN II-XII Intact


Additional comments: 





LUE weakness





- Psychiatric Exam


Psychiatric exam: Normal Affect, Normal Mood





- Skin


Skin Exam: Pallor, Warm





Results





- Vital Signs


Recent Vital Signs: 





 Last Vital Signs











Temp  99.9 F H  17 15:56


 


Pulse  102 H  17 15:56


 


Resp  20   17 15:56


 


BP  125/61   17 15:56


 


Pulse Ox  91 L  17 15:56














- Labs


Labs: 





 Laboratory Results - last 24 hr











  17





  20:38 03:01 07:08


 


POC Glucose (mg/dL)  330 H  313 H  317 H














  17





  11:12 16:21


 


POC Glucose (mg/dL)  281 H  298 H














Assessment & Plan





- Assessment and Plan (Free Text)


Assessment: 


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  

CVA with LUE weakness had recent right total hip replacement ad transferred to 

TCU for physical therapy. At present patient feeling well, denies any pain or 

discomfort , denies chest pain SOB. She appears slightly confused .











1. Right Hip Osteoarthritis s/p THR


Admit to TCU 


PT / OT consult


pain is controlled. will d/c narcotics since patient appears somewhat confused


Lovenox for DVT prophylaxis








2. KATARZYNA 


creatinine trending up post op


Most likely volume depleted


Start IVF


repeat BMP





3. Acute blood Loss anemia from recent surgery


Hgb dropped from 13 -- 10 


Monitor for now








4. Uncontrolled DM 


Continue accuchecks, Insulin coverage , diabetic diet 


resume levemir and Novolog


check hgb A1C








5. HTN


controlled without meds 








6. History CVA with LUE weakness


on ASA


pateint can nottake statin due to myalgias








7.Altered Mental status / Confusion 


Most likely secondary to narcotis


D/c all narcotics


Start Tylenol





8. Overweight BMI 30








9. DVt prophylaxis


lovenox

## 2017-12-23 RX ADMIN — ENOXAPARIN SODIUM SCH MG: 40 INJECTION SUBCUTANEOUS at 10:00

## 2017-12-23 RX ADMIN — INSULIN LISPRO SCH UNITS: 100 INJECTION, SUSPENSION SUBCUTANEOUS at 17:19

## 2017-12-23 RX ADMIN — INSULIN LISPRO SCH UNITS: 100 INJECTION, SUSPENSION SUBCUTANEOUS at 09:58

## 2017-12-23 RX ADMIN — INSULIN DETEMIR SCH UNITS: 100 INJECTION, SOLUTION SUBCUTANEOUS at 21:32

## 2017-12-23 RX ADMIN — INSULIN LISPRO SCH UNITS: 100 INJECTION, SUSPENSION SUBCUTANEOUS at 12:23

## 2017-12-24 LAB
BACTERIA #/AREA URNS HPF: (no result) /[HPF]
BILIRUB UR-MCNC: NEGATIVE MG/DL
BUN SERPL-MCNC: 54 MG/DL (ref 7–17)
CALCIUM SERPL-MCNC: 7.5 MG/DL (ref 8.4–10.2)
COLOR UR: YELLOW
ERYTHROCYTE [DISTWIDTH] IN BLOOD BY AUTOMATED COUNT: 14.8 % (ref 11.5–14.5)
GFR NON-AFRICAN AMERICAN: 43
GLUCOSE UR STRIP-MCNC: (no result) MG/DL
HGB BLD-MCNC: 7.7 G/DL (ref 12–16)
LEUKOCYTE ESTERASE UR-ACNC: (no result) LEU/UL
MCH RBC QN AUTO: 28.3 PG (ref 27–31)
MCHC RBC AUTO-ENTMCNC: 32 G/DL (ref 33–37)
MCV RBC AUTO: 88.5 FL (ref 81–99)
PH UR STRIP: 6 [PH] (ref 5–8)
PLATELET # BLD: 259 K/UL (ref 130–400)
PROT UR STRIP-MCNC: NEGATIVE MG/DL
RBC # BLD AUTO: 2.73 MIL/UL (ref 3.8–5.2)
RBC # UR STRIP: (no result) /UL
SP GR UR STRIP: < 1.005 (ref 1–1.03)
SQUAMOUS EPITHIAL: 8 /HPF (ref 0–5)
URINE CLARITY: (no result)
URINE NITRATE: NEGATIVE
UROBILINOGEN UR-MCNC: (no result) MG/DL (ref 0.2–1)
WBC # BLD AUTO: 14.5 K/UL (ref 4.8–10.8)

## 2017-12-24 RX ADMIN — INSULIN LISPRO SCH UNITS: 100 INJECTION, SUSPENSION SUBCUTANEOUS at 08:53

## 2017-12-24 RX ADMIN — INSULIN DETEMIR SCH UNITS: 100 INJECTION, SOLUTION SUBCUTANEOUS at 21:50

## 2017-12-24 RX ADMIN — INSULIN LISPRO SCH UNITS: 100 INJECTION, SUSPENSION SUBCUTANEOUS at 12:32

## 2017-12-24 RX ADMIN — INSULIN LISPRO SCH UNITS: 100 INJECTION, SUSPENSION SUBCUTANEOUS at 17:10

## 2017-12-24 RX ADMIN — ENOXAPARIN SODIUM SCH MG: 40 INJECTION SUBCUTANEOUS at 10:06

## 2017-12-24 RX ADMIN — INSULIN DETEMIR SCH: 100 INJECTION, SOLUTION SUBCUTANEOUS at 22:00

## 2017-12-25 LAB
ERYTHROCYTE [DISTWIDTH] IN BLOOD BY AUTOMATED COUNT: 15.1 % (ref 11.5–14.5)
HGB BLD-MCNC: 8.4 G/DL (ref 12–16)
MCH RBC QN AUTO: 28.2 PG (ref 27–31)
MCHC RBC AUTO-ENTMCNC: 31.9 G/DL (ref 33–37)
MCV RBC AUTO: 88.2 FL (ref 81–99)
PLATELET # BLD: 344 K/UL (ref 130–400)
RBC # BLD AUTO: 2.99 MIL/UL (ref 3.8–5.2)
WBC # BLD AUTO: 13.1 K/UL (ref 4.8–10.8)

## 2017-12-25 RX ADMIN — ENOXAPARIN SODIUM SCH MG: 40 INJECTION SUBCUTANEOUS at 09:28

## 2017-12-25 RX ADMIN — INSULIN LISPRO SCH UNITS: 100 INJECTION, SUSPENSION SUBCUTANEOUS at 12:19

## 2017-12-25 RX ADMIN — INSULIN LISPRO SCH UNITS: 100 INJECTION, SUSPENSION SUBCUTANEOUS at 08:11

## 2017-12-25 RX ADMIN — ALUMINUM HYDROXIDE, MAGNESIUM HYDROXIDE, AND SIMETHICONE PRN ML: 200; 200; 20 SUSPENSION ORAL at 12:42

## 2017-12-25 RX ADMIN — INSULIN DETEMIR SCH UNITS: 100 INJECTION, SOLUTION SUBCUTANEOUS at 21:28

## 2017-12-25 RX ADMIN — INSULIN LISPRO SCH UNITS: 100 INJECTION, SUSPENSION SUBCUTANEOUS at 16:58

## 2017-12-26 RX ADMIN — INSULIN DETEMIR SCH UNITS: 100 INJECTION, SOLUTION SUBCUTANEOUS at 21:57

## 2017-12-26 RX ADMIN — ALUMINUM HYDROXIDE, MAGNESIUM HYDROXIDE, AND SIMETHICONE PRN ML: 200; 200; 20 SUSPENSION ORAL at 11:28

## 2017-12-26 RX ADMIN — INSULIN LISPRO SCH UNITS: 100 INJECTION, SUSPENSION SUBCUTANEOUS at 12:54

## 2017-12-26 RX ADMIN — INSULIN LISPRO SCH UNITS: 100 INJECTION, SUSPENSION SUBCUTANEOUS at 17:43

## 2017-12-26 RX ADMIN — INSULIN LISPRO SCH UNITS: 100 INJECTION, SUSPENSION SUBCUTANEOUS at 08:30

## 2017-12-26 RX ADMIN — ENOXAPARIN SODIUM SCH MG: 40 INJECTION SUBCUTANEOUS at 08:30

## 2017-12-26 RX ADMIN — ALUMINUM HYDROXIDE, MAGNESIUM HYDROXIDE, AND SIMETHICONE PRN ML: 200; 200; 20 SUSPENSION ORAL at 17:47

## 2017-12-27 LAB
BUN SERPL-MCNC: 21 MG/DL (ref 7–17)
CALCIUM SERPL-MCNC: 8 MG/DL (ref 8.4–10.2)
ERYTHROCYTE [DISTWIDTH] IN BLOOD BY AUTOMATED COUNT: 15 % (ref 11.5–14.5)
GFR NON-AFRICAN AMERICAN: > 60
HGB BLD-MCNC: 8.4 G/DL (ref 12–16)
MCH RBC QN AUTO: 28.5 PG (ref 27–31)
MCHC RBC AUTO-ENTMCNC: 32.3 G/DL (ref 33–37)
MCV RBC AUTO: 88.2 FL (ref 81–99)
PLATELET # BLD: 425 K/UL (ref 130–400)
RBC # BLD AUTO: 2.95 MIL/UL (ref 3.8–5.2)
WBC # BLD AUTO: 12.6 K/UL (ref 4.8–10.8)

## 2017-12-27 RX ADMIN — INSULIN LISPRO SCH UNITS: 100 INJECTION, SUSPENSION SUBCUTANEOUS at 16:29

## 2017-12-27 RX ADMIN — ENOXAPARIN SODIUM SCH MG: 40 INJECTION SUBCUTANEOUS at 09:46

## 2017-12-27 RX ADMIN — INSULIN DETEMIR SCH UNITS: 100 INJECTION, SOLUTION SUBCUTANEOUS at 21:22

## 2017-12-27 RX ADMIN — INSULIN LISPRO SCH UNITS: 100 INJECTION, SUSPENSION SUBCUTANEOUS at 12:20

## 2017-12-27 RX ADMIN — INSULIN LISPRO SCH UNITS: 100 INJECTION, SUSPENSION SUBCUTANEOUS at 09:46

## 2017-12-28 VITALS — RESPIRATION RATE: 20 BRPM

## 2017-12-28 RX ADMIN — INSULIN LISPRO SCH UNITS: 100 INJECTION, SUSPENSION SUBCUTANEOUS at 12:35

## 2017-12-28 RX ADMIN — ENOXAPARIN SODIUM SCH MG: 40 INJECTION SUBCUTANEOUS at 08:37

## 2017-12-28 RX ADMIN — INSULIN LISPRO SCH UNITS: 100 INJECTION, SUSPENSION SUBCUTANEOUS at 16:51

## 2017-12-28 RX ADMIN — Medication SCH APPLIC: at 16:50

## 2017-12-28 RX ADMIN — INSULIN LISPRO SCH UNITS: 100 INJECTION, SUSPENSION SUBCUTANEOUS at 08:36

## 2017-12-28 RX ADMIN — INSULIN DETEMIR SCH UNITS: 100 INJECTION, SOLUTION SUBCUTANEOUS at 21:28

## 2017-12-28 RX ADMIN — ERGOCALCIFEROL SCH CAP: 1.25 CAPSULE ORAL at 15:08

## 2017-12-28 NOTE — CP.PCM.PN
Subjective





- Date & Time of Evaluation


Date of Evaluation: 12/28/17


Time of Evaluation: 14:00





- Subjective


Subjective: 





Patient was seen and examined getting dressed with assistance in her 

wheelchair. She states she is doing well with therapies. Complaining of some 

mild right lower leg swelling but denies any pain or any other complaints. 





Objective





- Vital Signs/Intake and Output


Vital Signs (last 24 hours): 


 











Temp Pulse Resp BP Pulse Ox


 


 97.8 F   77   20   140/54 L  98 


 


 12/28/17 08:22  12/28/17 08:36  12/28/17 08:22  12/28/17 08:36  12/28/17 08:22











- Medications


Medications: 


 Current Medications





Acetaminophen (Tylenol 325mg Tab)  325 mg PO Q4 PRN


   PRN Reason: pain1-3


   Last Admin: 12/24/17 04:42 Dose:  325 mg


Al Hydrox/Mg Hydrox/Simethicone (Maalox Plus 30 Ml)  30 ml PO Q6 PRN


   PRN Reason: Indigestion / Heartburn


   Last Admin: 12/26/17 17:47 Dose:  30 ml


Alprazolam (Xanax)  0.5 mg PO HS [facility]


   Last Admin: 12/27/17 21:15 Dose:  0.5 mg


Aspirin (Ecotrin)  81 mg PO DAILY [facility]


   Last Admin: 12/28/17 08:37 Dose:  81 mg


Carvedilol (Coreg)  3.125 mg PO BID [facility]


   Last Admin: 12/28/17 08:36 Dose:  3.125 mg


Celecoxib (Celebrex)  100 mg PO Q12 [facility]


   Last Admin: 12/28/17 08:37 Dose:  100 mg


Dimethicone (Proshield Plus Skin Protectant)  1 applic TOP Q8 [facility]


Enoxaparin Sodium (Lovenox)  40 mg SC DAILY [facility]


   PRN Reason: Protocol


   Last Admin: 12/28/17 08:37 Dose:  40 mg


Ergocalciferol (Drisdol 50,000 Intl Units Cap)  1 cap PO Q7D [facility]


   Last Admin: 12/21/17 16:42 Dose:  Not Given


Famotidine (Pepcid)  20 mg PO DAILY [facility]


   Last Admin: 12/28/17 08:37 Dose:  20 mg


Gabapentin (Neurontin)  100 mg PO BID [facility]


   Last Admin: 12/28/17 08:37 Dose:  100 mg


Insulin Detemir (Levemir)  18 units SC HS [facility]


   Last Admin: 12/27/17 21:22 Dose:  18 units


Insulin Human Regular (Humulin R)  0 units SC ACHS [facility]


   PRN Reason: Protocol


   Last Admin: 12/28/17 11:06 Dose:  Not Given


Insulin Lispro Protam/Lispro Human (Humalog Mix 75/25)  22 units SC TID [facility]


Losartan Potassium (Cozaar)  50 mg PO DAILY [facility]


   Last Admin: 12/28/17 08:36 Dose:  50 mg


Tramadol HCl (Ultram)  50 mg PO Q6 PRN


   PRN Reason: Pain, severe (8-10)











- Labs


Labs: 


 





 12/27/17 06:00 





 12/27/17 06:00 











- Additional Findings


Additional findings: 





Physical exam:





Constitutional- cooperative, awake, alert. No apparent distress.


Head- NCAT, PERRL. 


Eye- PERRL, EOMI


ENT- normal exam, MMM.  Poor dentition


Neck- normal inspection, supple, no JVD


Respiratory- CTAB, no wheezes rales rhonchi


Cardiovascular- RRR, +S1, +S2 no MRG


GI/Abdominal- normal bowel sounds, soft, no mass, no hsm


Skin- + 1 pitting edema to right leg. No tenderness. Ashtyn's sign (-). Stage 2 

sacral decubitis ulceration.  Warm, dry


Extremities Exam- normal capillary refill, normal inspection


Neurological Exam- alert, awake, oriented


Psych- Depressed mood, normal effect





Assessment and Plan





- Assessment and Plan (Free Text)


Plan: 





Assessment: 


81 y/o male with PMH HTN, , thromboemolic  disease to E,IDDM, OA, history of  

CVA with LUE weakness had recent right total hip replacement ad transferred to 

TCU for physical therapy. At present patient feeling well, denies any pain or 

discomfort , denies chest pain SOB. She appears slightly confused .











1. Right Hip Osteoarthritis s/p THR


Continued therapy in TCU


PT / OT consult


pain is controlled.  Confusion is gone since narcotics discontinued (other than 

tramadol)


+1 right leg edema likely 2/2 postop. if continued swelling and/or pain will 

consider right Le doppler to r/o DVT


Lovenox for DVT prophylaxis








2. KATARZYNA- resolved


creatintine stable after IV fluids


Encourage PO hydration


repeat labs in AM





3. Acute blood Loss anemia from recent surgery


Hg stablized at 8.4 on 12/25


Monitor 








4. Uncontrolled DM 


Continue accuchecks, Insulin coverage , diabetic diet 


resume levemir and Novolog


Increased Lispro mix from 18 to 20 units SC TID


HGA1C 9.0








5. HTN


- Adding back Olmesartan 20 mg po daily


- Adding Coreg 3.125 mg po BID








6. History CVA with LUE weakness


on ASA


pateint can nottake statin due to myalgias








7.Altered Mental status / Confusion 


Most likely secondary to narcotis


D/c all narcotics


Start Tylenol





8. Overweight BMI 30








9. DVt prophylaxis


lovenox

## 2017-12-29 RX ADMIN — ENOXAPARIN SODIUM SCH MG: 40 INJECTION SUBCUTANEOUS at 09:10

## 2017-12-29 RX ADMIN — INSULIN LISPRO SCH UNITS: 100 INJECTION, SUSPENSION SUBCUTANEOUS at 12:25

## 2017-12-29 RX ADMIN — Medication SCH APPLIC: at 17:41

## 2017-12-29 RX ADMIN — INSULIN LISPRO SCH UNITS: 100 INJECTION, SUSPENSION SUBCUTANEOUS at 09:11

## 2017-12-29 RX ADMIN — Medication SCH APPLIC: at 02:10

## 2017-12-29 RX ADMIN — INSULIN DETEMIR SCH: 100 INJECTION, SOLUTION SUBCUTANEOUS at 21:45

## 2017-12-29 RX ADMIN — Medication SCH APPLIC: at 09:10

## 2017-12-29 RX ADMIN — INSULIN LISPRO SCH UNITS: 100 INJECTION, SUSPENSION SUBCUTANEOUS at 17:29

## 2017-12-30 RX ADMIN — Medication SCH APPLIC: at 17:06

## 2017-12-30 RX ADMIN — Medication SCH APPLIC: at 02:00

## 2017-12-30 RX ADMIN — INSULIN LISPRO SCH UNITS: 100 INJECTION, SUSPENSION SUBCUTANEOUS at 12:28

## 2017-12-30 RX ADMIN — INSULIN LISPRO SCH UNITS: 100 INJECTION, SUSPENSION SUBCUTANEOUS at 17:02

## 2017-12-30 RX ADMIN — Medication SCH APPLIC: at 08:52

## 2017-12-30 RX ADMIN — ENOXAPARIN SODIUM SCH MG: 40 INJECTION SUBCUTANEOUS at 08:49

## 2017-12-30 RX ADMIN — INSULIN LISPRO SCH UNITS: 100 INJECTION, SUSPENSION SUBCUTANEOUS at 08:50

## 2017-12-30 RX ADMIN — INSULIN DETEMIR SCH: 100 INJECTION, SOLUTION SUBCUTANEOUS at 22:22

## 2017-12-31 RX ADMIN — Medication SCH APPLIC: at 01:32

## 2017-12-31 RX ADMIN — INSULIN DETEMIR SCH: 100 INJECTION, SOLUTION SUBCUTANEOUS at 22:11

## 2017-12-31 RX ADMIN — INSULIN LISPRO SCH UNITS: 100 INJECTION, SUSPENSION SUBCUTANEOUS at 13:01

## 2017-12-31 RX ADMIN — INSULIN LISPRO SCH UNITS: 100 INJECTION, SUSPENSION SUBCUTANEOUS at 17:12

## 2017-12-31 RX ADMIN — Medication SCH APPLIC: at 16:46

## 2017-12-31 RX ADMIN — INSULIN LISPRO SCH UNITS: 100 INJECTION, SUSPENSION SUBCUTANEOUS at 09:37

## 2017-12-31 RX ADMIN — ENOXAPARIN SODIUM SCH MG: 40 INJECTION SUBCUTANEOUS at 09:38

## 2017-12-31 RX ADMIN — Medication SCH APPLIC: at 09:39

## 2018-01-01 RX ADMIN — Medication SCH APPLIC: at 16:40

## 2018-01-01 RX ADMIN — INSULIN LISPRO SCH UNITS: 100 INJECTION, SUSPENSION SUBCUTANEOUS at 08:57

## 2018-01-01 RX ADMIN — INSULIN LISPRO SCH UNITS: 100 INJECTION, SUSPENSION SUBCUTANEOUS at 16:39

## 2018-01-01 RX ADMIN — INSULIN LISPRO SCH UNITS: 100 INJECTION, SUSPENSION SUBCUTANEOUS at 12:21

## 2018-01-01 RX ADMIN — Medication SCH APPLIC: at 00:10

## 2018-01-01 RX ADMIN — INSULIN DETEMIR SCH UNITS: 100 INJECTION, SOLUTION SUBCUTANEOUS at 21:55

## 2018-01-01 RX ADMIN — Medication SCH APPLIC: at 08:58

## 2018-01-01 RX ADMIN — INSULIN LISPRO SCH: 100 INJECTION, SUSPENSION SUBCUTANEOUS at 17:28

## 2018-01-01 RX ADMIN — ENOXAPARIN SODIUM SCH MG: 40 INJECTION SUBCUTANEOUS at 08:57

## 2018-01-02 RX ADMIN — INSULIN LISPRO SCH UNITS: 100 INJECTION, SUSPENSION SUBCUTANEOUS at 08:54

## 2018-01-02 RX ADMIN — Medication SCH APPLIC: at 08:55

## 2018-01-02 RX ADMIN — Medication SCH APPLIC: at 17:25

## 2018-01-02 RX ADMIN — INSULIN LISPRO SCH UNITS: 100 INJECTION, SUSPENSION SUBCUTANEOUS at 12:46

## 2018-01-02 RX ADMIN — INSULIN DETEMIR SCH: 100 INJECTION, SOLUTION SUBCUTANEOUS at 21:12

## 2018-01-02 RX ADMIN — INSULIN LISPRO SCH UNITS: 100 INJECTION, SUSPENSION SUBCUTANEOUS at 17:24

## 2018-01-02 RX ADMIN — ENOXAPARIN SODIUM SCH MG: 40 INJECTION SUBCUTANEOUS at 08:55

## 2018-01-02 RX ADMIN — Medication SCH APPLIC: at 06:33

## 2018-01-02 NOTE — CP.PCM.PN
Subjective





- Date & Time of Evaluation


Date of Evaluation: 01/02/18


Time of Evaluation: 14:29





- Subjective


Subjective: 





doing well today no pain


working with PT





Objective





- Vital Signs/Intake and Output


Vital Signs (last 24 hours): 


 











Temp Pulse Resp BP Pulse Ox


 


 97.2 F L  72   20   126/66   99 


 


 01/02/18 07:52  01/02/18 08:55  01/02/18 07:52  01/02/18 08:55  01/02/18 07:52











- Medications


Medications: 


 Current Medications





Acetaminophen (Tylenol 325mg Tab)  325 mg PO Q4 PRN


   PRN Reason: pain1-3


   Last Admin: 12/24/17 04:42 Dose:  325 mg


Al Hydrox/Mg Hydrox/Simethicone (Maalox Plus 30 Ml)  30 ml PO Q6 PRN


   PRN Reason: Indigestion / Heartburn


   Last Admin: 12/26/17 17:47 Dose:  30 ml


Alprazolam (Xanax)  0.25 mg PO HS On [facility] of [facility]


   Stop: 01/07/18 22:01


   Last Admin: 01/01/18 21:56 Dose:  0.25 mg


Aspirin (Ecotrin)  81 mg PO DAILY On [facility] of [facility]


   Last Admin: 01/02/18 08:54 Dose:  81 mg


Bismuth Subsalicylate (Pepto Bismol)  262 mg PO Q1 PRN


   PRN Reason: Dyspepsia


   Last Admin: 12/29/17 12:25 Dose:  262 mg


Carvedilol (Coreg)  3.125 mg PO BID On [facility] of [facility]


   Last Admin: 01/02/18 08:55 Dose:  3.125 mg


Celecoxib (Celebrex)  100 mg PO Q12 On license of [facility]


   Last Admin: 01/02/18 08:54 Dose:  100 mg


Cephalexin Monohydrate (Keflex)  500 mg PO Q12 On license of [facility]


   PRN Reason: Protocol


   Stop: 01/08/18 21:01


   Last Admin: 01/02/18 12:46 Dose:  500 mg


Clotrimazole (Lotrimin 1% Vaginal)  1 applic VG HS On license of [facility]


   Last Admin: 01/01/18 22:00 Dose:  1 anti


Dimethicone (Proshield Plus Skin Protectant)  1 applic TOP Q8 On license of [facility]


   Last Admin: 01/02/18 08:55 Dose:  1 applic


Enoxaparin Sodium (Lovenox)  40 mg SC DAILY On license of [facility]


   PRN Reason: Protocol


   Last Admin: 01/02/18 08:55 Dose:  40 mg


Ergocalciferol (Drisdol 50,000 Intl Units Cap)  1 cap PO Q7D On license of [facility]


   Last Admin: 12/28/17 15:08 Dose:  1 cap


Famotidine (Pepcid)  20 mg PO DAILY On license of [facility]


   Last Admin: 01/02/18 08:56 Dose:  20 mg


Gabapentin (Neurontin)  100 mg PO BID On license of [facility]


   Last Admin: 01/02/18 08:55 Dose:  100 mg


Insulin Detemir (Levemir)  18 units SC HS [facility] of [facility]


   Last Admin: 01/01/18 21:55 Dose:  18 units


Insulin Human Regular (Humulin R)  0 units SC ACHS On license of [facility]


   PRN Reason: Protocol


   Last Admin: 01/02/18 12:47 Dose:  2 units


Insulin Lispro Protam/Lispro Human (Humalog Mix 75/25)  22 units SC TID On license of [facility]


   Last Admin: 01/02/18 12:46 Dose:  22 units


Losartan Potassium (Cozaar)  50 mg PO DAILY On license of [facility]


   Last Admin: 01/02/18 08:55 Dose:  50 mg


Tramadol HCl (Ultram)  50 mg PO Q6 PRN


   PRN Reason: Pain, moderate (4-7)











- Labs


Labs: 


 





 12/27/17 06:00 





 12/27/17 06:00 











- Constitutional


Appears: Well, Non-toxic, No Acute Distress





- Head Exam


Head Exam: ATRAUMATIC, NORMAL INSPECTION, NORMOCEPHALIC





- Eye Exam


Eye Exam: Normal appearance


Pupil Exam: NORMAL ACCOMODATION





- ENT Exam


ENT Exam: Mucous Membranes Moist





- Respiratory Exam


Respiratory Exam: Clear to Ausculation Bilateral, NORMAL BREATHING PATTERN.  

absent: Rales, Rhonchi, Wheezes





- Cardiovascular Exam


Cardiovascular Exam: REGULAR RHYTHM, +S1, +S2





- GI/Abdominal Exam


GI & Abdominal Exam: Soft, Normal Bowel Sounds, Organomegaly.  absent: 

Tenderness


Additional comments: 





obese





- Extremities Exam


Additional comments: 





right hip tenderness





- Neurological Exam


Neurological Exam: Alert, Awake, Oriented x3





- Psychiatric Exam


Psychiatric exam: Normal Affect, Normal Mood





- Skin


Skin Exam: Normal Color





Assessment and Plan





- Assessment and Plan (Free Text)


Assessment: 








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  

CVA with LUE weakness had recent right total hip replacement ad transferred to 

TCU for physical therapy. At present patient feeling well, denies any pain or 

discomfort , denies chest pain SOB. She appears slightly confused .











1. Right Hip Osteoarthritis s/p THR


Continued therapy in TCU


PT / OT consult


pain is controlled.  Confusion is gone since narcotics discontinued (other than 

tramadol)


+1 right leg edema likely 2/2 postop. if continued swelling and/or pain will 

consider right Le doppler to r/o DVT


Lovenox for DVT prophylaxis








2. KATARZYNA- resolved


creatintine stable after IV fluids


Encourage PO hydration


repeat labs in AM





3. Acute blood Loss anemia from recent surgery


Hg stablized at 8.4 on 12/25


Monitor 








4. Uncontrolled DM 


Continue accuchecks, Insulin coverage , diabetic diet 


resume levemir and Novolog


Increased Lispro mix from 18 to 20 units SC TID


HGA1C 9.0





5. UA positive 


no symptoms


will add abx due to recent OR likely had cath


keflex po added 1/2/18

## 2018-01-03 VITALS — DIASTOLIC BLOOD PRESSURE: 65 MMHG | SYSTOLIC BLOOD PRESSURE: 148 MMHG | HEART RATE: 78 BPM

## 2018-01-03 VITALS — OXYGEN SATURATION: 98 % | TEMPERATURE: 99 F

## 2018-01-03 RX ADMIN — INSULIN LISPRO SCH UNITS: 100 INJECTION, SUSPENSION SUBCUTANEOUS at 08:43

## 2018-01-03 RX ADMIN — INSULIN LISPRO SCH UNITS: 100 INJECTION, SUSPENSION SUBCUTANEOUS at 13:05

## 2018-01-03 RX ADMIN — Medication SCH: at 00:09

## 2018-01-03 RX ADMIN — ENOXAPARIN SODIUM SCH MG: 40 INJECTION SUBCUTANEOUS at 08:44

## 2018-01-03 RX ADMIN — Medication SCH APPLIC: at 08:46

## 2018-01-03 NOTE — CP.PCM.PN
Subjective





- Subjective


Subjective: 





no AD  mild  pain R Hip





Objective





- Vital Signs/Intake and Output


Vital Signs (last 24 hours): 


 











Temp Pulse Resp BP Pulse Ox


 


 99.0 F   78   20   148/65   98 


 


 01/03/18 09:22  01/03/18 09:22  01/03/18 09:22  01/03/18 09:22  01/03/18 09:22











- Labs


Labs: 


 





 12/27/17 06:00 





 12/27/17 06:00 











- Constitutional


Appears: No Acute Distress





- Head Exam


Head Exam: NORMAL INSPECTION





- Eye Exam


Eye Exam: PERRL





- ENT Exam


ENT Exam: Normal Exam





- Neck Exam


Neck Exam: Normal Inspection





- Respiratory Exam


Respiratory Exam: NORMAL BREATHING PATTERN





- Cardiovascular Exam


Cardiovascular Exam: REGULAR RHYTHM





- GI/Abdominal Exam


GI & Abdominal Exam: Soft, Normal Bowel Sounds





- Extremities Exam


Extremities Exam: Tenderness (mild  tenderness R  hip , surgical incisions  

healin well , neuromuscular  atatus  distal good , area  of  induration R hand 

palm with mild  tenderness , L hand  weakness)


Additional comments: 





R and  L Sacral Stage II , purple  discoloration R L heel improved





- Back Exam


Back Exam: NORMAL INSPECTION





- Neurological Exam


Neurological Exam: Alert, Oriented x3





- Psychiatric Exam


Psychiatric exam: Anxious





- Skin


Skin Exam: Warm





Assessment and Plan


(1) Status post total hip replacement, right


Status: Acute   





(2) Vitamin D deficiency


Status: Acute   





(3) HTN (hypertension)


Status: Chronic   





(4) History of arterial embolism


Status: Chronic   





(5) Diabetes mellitus type II, controlled


Status: Acute   





- Assessment and Plan (Free Text)


Plan: 





Improved  and  stable to be transfered to [facility] today to continue PT, see inst/

med sheet.

## 2018-01-03 NOTE — CP.PCM.PN
Subjective





- Date & Time of Evaluation


Date of Evaluation: 01/03/18


Time of Evaluation: 09:30





- Subjective


Subjective: 





81 yo F 2 wks s/p RTHR


Pt seen and examined at bedside, comfortable


Pt c/o mild right hip pain


Pt denies any SOB, chest pain, N/V/D, numbness/tingling RLE





Objective





- Vital Signs/Intake and Output


Vital Signs (last 24 hours): 


 











Temp Pulse Resp BP Pulse Ox


 


 99.0 F   78   20   148/65   98 


 


 01/03/18 09:22  01/03/18 09:22  01/03/18 09:22  01/03/18 09:22  01/03/18 09:22











- Medications


Medications: 


 Current Medications





Acetaminophen (Tylenol 325mg Tab)  325 mg PO Q4 PRN


   PRN Reason: pain1-3


   Last Admin: 12/24/17 04:42 Dose:  325 mg


Al Hydrox/Mg Hydrox/Simethicone (Maalox Plus 30 Ml)  30 ml PO Q6 PRN


   PRN Reason: Indigestion / Heartburn


   Last Admin: 12/26/17 17:47 Dose:  30 ml


Alprazolam (Xanax)  0.25 mg PO HS [facility]


   Stop: 01/07/18 22:01


   Last Admin: 01/02/18 21:08 Dose:  0.25 mg


Aspirin (Ecotrin)  81 mg PO DAILY [facility]


   Last Admin: 01/03/18 08:45 Dose:  81 mg


Bismuth Subsalicylate (Pepto Bismol)  262 mg PO Q1 PRN


   PRN Reason: Dyspepsia


   Last Admin: 12/29/17 12:25 Dose:  262 mg


Carvedilol (Coreg)  3.125 mg PO BID [facility]


   Last Admin: 01/03/18 08:45 Dose:  3.125 mg


Celecoxib (Celebrex)  100 mg PO Q12 [facility]


   Last Admin: 01/03/18 08:44 Dose:  100 mg


Cephalexin Monohydrate (Keflex)  500 mg PO Q12 [facility]


   PRN Reason: Protocol


   Stop: 01/08/18 21:01


   Last Admin: 01/03/18 08:45 Dose:  500 mg


Clotrimazole (Lotrimin 1% Vaginal)  1 applic VG HS [facility]


   Last Admin: 01/02/18 21:13 Dose:  1 appl


Dimethicone (Proshield Plus Skin Protectant)  1 applic TOP Q8 [facility]


   Last Admin: 01/03/18 08:46 Dose:  1 applic


Enoxaparin Sodium (Lovenox)  40 mg SC DAILY [facility]


   PRN Reason: Protocol


   Last Admin: 01/03/18 08:44 Dose:  40 mg


Ergocalciferol (Drisdol 50,000 Intl Units Cap)  1 cap PO Q7D [facility]


   Last Admin: 12/28/17 15:08 Dose:  1 cap


Famotidine (Pepcid)  20 mg PO DAILY [facility]


   Last Admin: 01/03/18 08:46 Dose:  20 mg


Gabapentin (Neurontin)  100 mg PO BID [facility]


   Last Admin: 01/03/18 08:45 Dose:  100 mg


Insulin Detemir (Levemir)  18 units SC HS [facility]


   Last Admin: 01/02/18 21:12 Dose:  Not Given


Insulin Human Regular (Humulin R)  0 units SC ACHS [facility]


   PRN Reason: Protocol


   Last Admin: 01/03/18 06:34 Dose:  2 units


Insulin Lispro Protam/Lispro Human (Humalog Mix 75/25)  22 units SC TID [facility]


   Last Admin: 01/03/18 08:43 Dose:  22 units


Losartan Potassium (Cozaar)  50 mg PO DAILY [facility]


   Last Admin: 01/03/18 08:45 Dose:  50 mg


Tramadol HCl (Ultram)  50 mg PO Q6 PRN


   PRN Reason: Pain, moderate (4-7)











- Labs


Labs: 


 





 12/27/17 06:00 





 12/27/17 06:00 











- Constitutional


Appears: Well, No Acute Distress





- Respiratory Exam


Respiratory Exam: Clear to Ausculation Bilateral, NORMAL BREATHING PATTERN





- Cardiovascular Exam


Cardiovascular Exam: REGULAR RHYTHM, RRR





- Extremities Exam


Additional comments: 





Right hip:


Incision sites, C/D/I , staples and sutures removed


Calves soft and nontender b/l


N/V intact distally


Distal pulses wnl 





Assessment and Plan





- Assessment and Plan (Free Text)


Assessment: 





81 yo F 2 wks s/p RTHR 


Plan: 





Pain Control


PT/OT WBAT 


DVT ppx


D/C planning

## 2018-03-24 ENCOUNTER — HOSPITAL ENCOUNTER (INPATIENT)
Dept: HOSPITAL 14 - H.ER | Age: 83
LOS: 3 days | Discharge: NURSING, REHAB OR LONG-TERM CARE | DRG: 300 | End: 2018-03-27
Attending: INTERNAL MEDICINE | Admitting: INTERNAL MEDICINE
Payer: MEDICARE

## 2018-03-24 VITALS — BODY MASS INDEX: 26.6 KG/M2

## 2018-03-24 DIAGNOSIS — R30.0: ICD-10-CM

## 2018-03-24 DIAGNOSIS — E11.319: ICD-10-CM

## 2018-03-24 DIAGNOSIS — E11.51: ICD-10-CM

## 2018-03-24 DIAGNOSIS — E11.65: ICD-10-CM

## 2018-03-24 DIAGNOSIS — L03.115: ICD-10-CM

## 2018-03-24 DIAGNOSIS — I10: ICD-10-CM

## 2018-03-24 DIAGNOSIS — Z79.4: ICD-10-CM

## 2018-03-24 DIAGNOSIS — E86.0: ICD-10-CM

## 2018-03-24 DIAGNOSIS — I82.441: ICD-10-CM

## 2018-03-24 DIAGNOSIS — E11.42: ICD-10-CM

## 2018-03-24 DIAGNOSIS — E78.00: ICD-10-CM

## 2018-03-24 DIAGNOSIS — B35.3: ICD-10-CM

## 2018-03-24 DIAGNOSIS — E78.5: ICD-10-CM

## 2018-03-24 DIAGNOSIS — K29.70: ICD-10-CM

## 2018-03-24 DIAGNOSIS — I82.431: Primary | ICD-10-CM

## 2018-03-24 DIAGNOSIS — F41.9: ICD-10-CM

## 2018-03-24 DIAGNOSIS — Z96.641: ICD-10-CM

## 2018-03-24 DIAGNOSIS — I69.331: ICD-10-CM

## 2018-03-24 LAB
ALBUMIN SERPL-MCNC: 3.5 G/DL (ref 3.5–5)
ALBUMIN/GLOB SERPL: 1.1 {RATIO} (ref 1–2.1)
ALT SERPL-CCNC: 28 U/L (ref 9–52)
APTT BLD: 26.8 SECONDS (ref 25.6–37.1)
AST SERPL-CCNC: 24 U/L (ref 14–36)
BASE EXCESS BLDV CALC-SCNC: 1.9 MMOL/L (ref 0–2)
BASOPHILS # BLD AUTO: 0.1 K/UL (ref 0–0.2)
BASOPHILS NFR BLD: 1.3 % (ref 0–2)
BILIRUB UR-MCNC: NEGATIVE MG/DL
BUN SERPL-MCNC: 30 MG/DL (ref 7–17)
CALCIUM SERPL-MCNC: 9.2 MG/DL (ref 8.4–10.2)
COLOR UR: YELLOW
EOSINOPHIL # BLD AUTO: 0.2 K/UL (ref 0–0.7)
EOSINOPHIL NFR BLD: 2.5 % (ref 0–4)
ERYTHROCYTE [DISTWIDTH] IN BLOOD BY AUTOMATED COUNT: 15.3 % (ref 11.5–14.5)
GFR NON-AFRICAN AMERICAN: > 60
GLUCOSE UR STRIP-MCNC: >=500 MG/DL
HGB BLD-MCNC: 12.3 G/DL (ref 12–16)
INR PPP: 1 (ref 0.9–1.2)
LEUKOCYTE ESTERASE UR-ACNC: (no result) LEU/UL
LYMPHOCYTES # BLD AUTO: 2.3 K/UL (ref 1–4.3)
LYMPHOCYTES NFR BLD AUTO: 28.3 % (ref 20–40)
MCH RBC QN AUTO: 26.6 PG (ref 27–31)
MCHC RBC AUTO-ENTMCNC: 32.7 G/DL (ref 33–37)
MCV RBC AUTO: 81.3 FL (ref 81–99)
MONOCYTES # BLD: 0.5 K/UL (ref 0–0.8)
MONOCYTES NFR BLD: 6.1 % (ref 0–10)
NEUTROPHILS # BLD: 4.9 K/UL (ref 1.8–7)
NEUTROPHILS NFR BLD AUTO: 61.8 % (ref 50–75)
NRBC BLD AUTO-RTO: 0.1 % (ref 0–0)
PCO2 BLDV: 50 MMHG (ref 40–60)
PH BLDV: 7.36 [PH] (ref 7.32–7.43)
PH UR STRIP: 6 [PH] (ref 5–8)
PLATELET # BLD: 322 K/UL (ref 130–400)
PMV BLD AUTO: 8 FL (ref 7.2–11.7)
PROT UR STRIP-MCNC: NEGATIVE MG/DL
PROTHROMBIN TIME: 11 SECONDS (ref 9.8–13.1)
RBC # BLD AUTO: 4.62 MIL/UL (ref 3.8–5.2)
RBC # UR STRIP: NEGATIVE /UL
SP GR UR STRIP: 1.01 (ref 1–1.03)
SQUAMOUS EPITHIAL: 1 /HPF (ref 0–5)
URINE CLARITY: (no result)
UROBILINOGEN UR-MCNC: (no result) MG/DL (ref 0.2–1)
VENOUS BLOOD FIO2: 21 %
VENOUS BLOOD GAS PO2: 31 MM/HG (ref 30–55)
WBC # BLD AUTO: 8 K/UL (ref 4.8–10.8)

## 2018-03-24 RX ADMIN — INSULIN LISPRO SCH: 100 INJECTION, SOLUTION INTRAVENOUS; SUBCUTANEOUS at 22:51

## 2018-03-24 NOTE — RAD
HISTORY:

Sepsis Patient  



COMPARISON:

Comparison made with chest radiograph 12/14/2017 



FINDINGS:



LUNGS:

Elevation right hemidiaphragm possibly due to eventration.  There is 

also some mild scalloping of the right hemidiaphragm. . 



No significant interval change. 



PLEURA:

No significant pleural effusion identified, no pneumothorax apparent.



CARDIOVASCULAR:

Cardiomegaly.



OSSEOUS STRUCTURES:

No significant abnormalities.



VISUALIZED UPPER ABDOMEN:

Normal.



OTHER FINDINGS:

None.



IMPRESSION:

Elevation right hemidiaphragm possibly due to eventration.  There is 

also some mild scalloping of the right hemidiaphragm. 



Cardiomegaly. 



No significant interval change.

## 2018-03-24 NOTE — US
PROCEDURE:  Right lower extremity venous duplex Doppler. 



HISTORY:

r/o dvt







COMPARISON:

None available.



TECHNIQUE:

Common femoral, superficial femoral, popliteal and posterior tibial 

veins were evaluated. Flow was assessed with color Doppler, 

compressibility, assessment of phasic flow and augmentation response. 



FINDINGS:



COMMON FEMORAL VEIN:

Unremarkable.



SUPERFICIAL FEMORAL VEIN:

Unremarkable.



POPLITEAL VEIN:

There is noncompressible thrombus within the right popliteal and 

right posterior tibial veins.  



POSTERIOR TIBIAL VEIN:

Unremarkable.



OTHER FINDINGS:

None.



IMPRESSION:

Noncompressible thrombus within the right popliteal and right 

posterior tibial veins.

## 2018-03-24 NOTE — RAD
PROCEDURE:  Right foot dated 03/24/2018. 



HISTORY:

Pain



COMPARISON:

No prior study available for comparison



FINDINGS:



BONES:

No evidence of acute displaced fracture nor dislocation. The osseous 

structures appear intact however note is made of what appears 

represent cortical and subcortical cystic changes along the lateral 

margin proximal 3rd metatarsal nonspecific. 



JOINTS:

Multi articular degenerative osteoarthritis particularly notable 

involving the PIP and DIP joints.  Hammertoe deformity 2nd digit. .  

Prominent posterior and smaller plantar calcaneal enthesophytes. 



SOFT TISSUES:

With 



Normal. 



OTHER FINDINGS:

None.



IMPRESSION:

No definite evidence of acute displaced fracture nor dislocation.  

Multi articular DJD.  Nonspecific and subcortical cystic changes 

proximal aspect right 3rd metatarsal.

## 2018-03-24 NOTE — CP.PCM.PN
Subjective





- Date & Time of Evaluation


Date of Evaluation: 03/24/18


Time of Evaluation: 11:00





- Subjective


Subjective: 





Podiatry Consult Note- Dr. Cordero





83 y.o female with PMH of HTN, DM, anxiety was consulted by the ED for right 

swollen foot and right leg cellulitis. Patient is seen with daughter at 

bedside. Reports recent R hip replacement in December 2018 by Dr. Oliver. 

Daughter reported that the leg swelling has come and go for about 1 month now; 

the redness in the foot has been about 2 months. Patient reports recent pain to 

the right foot in which she describes as a burning sensation. Reports severe 

pain with touching the foot. Denies calf pain or tenderness during visitation. 

Denies chest pain, chills, fever, shortness of breath, or vomiting. 





PMH:  HTN, DM, anxiety, L arm blood clot, mini stroke left hand


PSH: gall bladder removal, R hip replacement


MEDS: see medication list


ALL: NKDA -reports does not feel well with cortisol


FH-mom DM


SH: denies smoking, drinking or illicit drug use





 





Objective





- Vital Signs/Intake and Output


Vital Signs (last 24 hours): 


 











Temp Pulse Resp BP Pulse Ox


 


 98 F   71   18   170/79 H  99 


 


 03/24/18 09:55  03/24/18 09:55  03/24/18 09:55  03/24/18 09:55  03/24/18 10:55











- Medications


Medications: 


 Current Medications





Sodium Chloride (Sodium Chloride 0.9%)  500 mls @ 100 mls/hr IV .Q5H [facility]











- Labs


Labs: 


 





 03/24/18 11:29 





 03/24/18 11:29 





 











PT  11.0 Seconds (9.8-13.1)   03/24/18  11:29    


 


INR  1.0  (0.9-1.2)   03/24/18  11:29    


 


APTT  26.8 Seconds (25.6-37.1)   03/24/18  11:29    














- Constitutional


Appears: Well, Non-toxic, No Acute Distress





- Extremities Exam


Extremities Exam: absent: Calf Tenderness


Additional comments: 





Vasc: DP and PT weakly palpable, temperature gradient WNL, no increase warmth 

noted to the right LE, CFT delayed x10 seconds


Ortho: severe pain with palpation to the foot below the ankle, no pain with 

palpation to the calf


Neuro: gross and protective sensation diminished


Derm: calluses at the plantar heel measuring approximately 1 x 1 cm, no 

underlying ulceration appreciated, no open lesions


erythema noted to the entire right foot below the ankle, mild erythema noted to 

the entire right LE


scaly circular lesions noted to the bilaterally foot








- Neurological Exam


Neurological Exam: Alert, Awake, Oriented x3





- Psychiatric Exam


Psychiatric exam: Normal Affect, Normal Mood





Assessment and Plan





- Assessment and Plan (Free Text)


Assessment: 





83 y.o female with PMH of HTN, DM, anxiety was consulted by the ED for right 

swollen foot and right leg cellulitis


Plan: 





Patient examined and evaluated


Discussed plan in detail with attending Dr. Cordero


Labs, charts, vitals reviewed


U/S reviewed- DVT of right LE


Foot X-ray no fracture noted, cystic changes noted to proximal right 3rd 

metatarsal


Patient will be admitted for DVT treatment


Lotrimin for tinea pedis


Recommends abx for cellulitis


Podiatry will continue to follow in house


Thank you for allowing us to take part in patient's care

## 2018-03-24 NOTE — RAD
PROCEDURE:  Pelvis right hip dated 03/24/2018 



HISTORY:

Pain 



COMPARISON:

Comparison made with prior study dated 12/19/2017



TECHNIQUE:

Frontal view of the pelvis and frontal/frogleg lateral views of the 

right hip performed.



FINDINGS:

Current study re- demonstrates a right-sided total hip replacement.  

Hardware appears intact without evidence of loosening or infection. 

No evidence of acute displaced fracture nor dislocation



Re- demonstrated is a large elliptical shaped calcified density on 

overlying the mid true pelvis consistent with a large calcified 

uterine fibroid 



IMPRESSION:

Right total hip replacement.  No evidence of acute displaced fracture 

nor dislocation. No evidence of hardware failure.

## 2018-03-24 NOTE — CT
PROCEDURE:  CT Chest with contrast (Pulmonary Angiogram)



HISTORY:

Chest pain 



COMPARISON:

Comparison made with prior chest radiograph earlier same day. 



TECHNIQUE:

Axial computed tomography images were obtained of the chest in the 

pulmonary arterial phase of enhancement. Coronal and sagittal 

reformatted images were created and reviewed.



Intravenous contrast dose: 95 cc Visipaque 320 contrast material. 



Radiation dose:



Total exam DLP = 405.29 mGy-cm.



This CT exam was performed using one or more of the following dose 

reduction techniques: Automated exposure control, adjustment of the 

mA and/or kV according to patient size, and/or use of iterative 

reconstruction technique.



FINDINGS:



PULMONARY ARTERIES:

The visualized pulmonary trunk, right and left main, segmental and 

proximal subsegmental branches of the pulmonary arteries appear 

relatively well opacified with no definitive filling defects seen to 

suggest acute pulmonary embolus. Pulmonary trunk measures 

approximately 2.8 cm in greatest dimension. 



AORTA:

No acute findings. No thoracic aortic aneurysm. 



LUNGS:

Minor atelectasis and or scarring changes seen in the left lingular 

region and left lower lobe. . .  There also appears to be mild 

localized borderline bronchiectasis in the right lower lobe 



PLEURAL SPACES:

Unremarkable. No effusion or pneumothorax. 



HEART:

Heart size is within range of normal. .  There is mild left 

ventricular hypertrophy.  Prominent of left atrium. The the ascending 

thoracic aorta measures approximately 2.79 cm and descending thoracic 

aorta measures approximately 2.2 cm.  There appears to be a tiny 

amount of fluid adjacent to the anteromedial borders of the ascending 

thoracic aorta and pulmonary trunk. 



Few small nonspecific mediastinal lymph nodes. No significant hilar 

adenopathy. Questionable residual fluid within the lumen of the mid 

esophagus versus wall thickening.  Consider followup endoscopy if 

further evaluation is required based on clinical correlation.  There 

is a tiny hiatal hernia. 



LYMPH NODES:

As above



BONES, CHEST WALL:

Minor multilevel degenerative spondylosis of the thoracic spine.  No 

acute compression fractures no retropulsed fragments.



OTHER FINDINGS:

Cholecystectomy . 



Large exophytic cyst seen arising from the anterolateral upper pole 

right kidney. 



IMPRESSION:

No evidence of acute central pulmonary embolus. Minor chronic 

atelectasis and or scarring changes left lingular region.



Suggestion minor localized borderline bronchiectasis right lower lobe 



Questionable small amount of residual fluid of versus wall thickening 

mid esophagus.  Consider followup endoscopy. 



Exophytic cyst right kidney.

## 2018-03-24 NOTE — ED PDOC
HPI: General Adult


Time Seen by Provider: 03/24/18 09:53


Chief Complaint (Nursing): Lower Extremity Problem/Injury


Chief Complaint (Provider): Foot pain


History Per: Patient, Family


History/Exam Limitations: no limitations


Onset/Duration Of Symptoms: Days (1-2 weeks)


Current Symptoms Are (Timing): Still Present


Additional Complaint(s): 





Pt. had hip surgery months ago and sent to the rehab and nursing home for 

further care.  Developed b/l foot ulcers on back of heels b/l but it healed 

now.  Surgery was by Dr. Oliver.  Pt. has been having right hip pain since 

surgery.  Developed R foot swelling and pain/redness for a weakness.  No 

numbness, tingles.  No fever.  No chest pain, dyspnea, abd pain.  Pt. 

ambulatory but with pain in hip and foot.  Pt. also for few weeks of dysuria.  





Past Medical History


Reviewed: Nursing Documentation, Vital Signs


Vital Signs: 


 Last Vital Signs











Temp  98 F   03/24/18 09:55


 


Pulse  71   03/24/18 09:55


 


Resp  18   03/24/18 09:55


 


BP  170/79 H  03/24/18 09:55


 


Pulse Ox  99   03/24/18 10:55














- Medical History


PMH: Anxiety, Arthritis, Back Problems (Herniated lumbar disc), CVA (L arm 

weakness), Diabetes, Gastritis (Hiatus  Hernia), HTN, Hypercholesterolemia (

Patient  can not  take  Statins due  to myalgias.)


   Denies: Chronic Kidney Disease


Other PMH: blood clot in arm





- Surgical History


Surgical History: Cholecystectomy


Other surgeries: hip surgery





- Family History


Family History: States: Unknown Family Hx





- Living Arrangements


Living Arrangements: Nursing Home/Assist Lvng





- Social History


Alcohol: None


Drugs: Denies





- Home Medications


Home Medications: 


 Ambulatory Orders











 Medication  Instructions  Recorded


 


Alprazolam [Xanax] 0.5 mg PO HS 10/12/16


 


Aspirin [Ecotrin] 81 mg PO DAILY 10/12/16


 


Insulin Detemir [Levemir] 12 unit SC HS 10/12/16


 


Olmesartan [Benicar**Nf**] 20 mg PO DAILY 10/12/16


 


Verapamil [Verapamil HCl] 80 mg PO HS 10/12/16


 


Carvedilol [Coreg] 3.125 mg PO BID 12/19/17


 


Cholecalciferol (Vitamin D3) 50,000 units PO ASDIR 12/19/17





[Vitamin D3]  


 


Gabapentin [Neurontin] 100 mg PO BID 12/19/17


 


Icosapent Ethyl [Vascepa] 1 gm PO BID 12/19/17


 


Insulin Aspart/Insulin Aspar 15 units SUBCUT TID 12/19/17





[Novolog Mix 70/30 (70/30  





units/ml)]  


 


Tramadol HCl [Ultram] 50 mg PO QID PRN 12/19/17


 


Acetaminophen [Tylenol 325mg tab] 325 mg PO Q4 PRN  tab 12/21/17


 


Celecoxib [celeBREX] 100 mg PO Q12  cap 12/21/17


 


Enoxaparin [Lovenox] 40 mg SC DAILY  syr 12/21/17


 


Cephalexin [cephalexin] 500 mg PO Q12 01/03/18














- Allergies


Allergies/Adverse Reactions: 


 Allergies











Allergy/AdvReac Type Severity Reaction Status Date / Time


 


No Known Allergies Allergy   Verified 03/24/18 09:55














Review of Systems


ROS Statement: Except As Marked, All Systems Reviewed And Found Negative


Genitourinary Female: Positive for: Dysuria


Musculoskeletal: Positive for: Leg Pain





Physical Exam





- Reviewed


Nursing Documentation Reviewed: Yes


Vital Signs Reviewed: Yes





- Physical Exam


Appears: Positive for: Non-toxic, No Acute Distress


Head Exam: Positive for: ATRAUMATIC, NORMAL INSPECTION, NORMOCEPHALIC


Skin: Positive for: Normal Color, Warm, DRY


Eye Exam: Positive for: EOMI, Normal appearance, PERRL


ENT: Positive for: Normal ENT Inspection


Neck: Positive for: Normal, Painless ROM, Supple


Cardiovascular/Chest: Positive for: Regular Rate, Rhythm


Respiratory: Positive for: CNT, Normal Breath Sounds


Pulses-Dorsalis Pedis (R): 2+


Gastrointestinal/Abdominal: Positive for: Normal Exam, Bowel Sounds, Soft.  

Negative for: Tenderness


Back: Positive for: Normal Inspection.  Negative for: L CVA Tenderness, R CVA 

Tenderness


Extremity: Negative for: Normal ROM (tender R hip mild but able to have ROM 

almost full; R  diffuse with mild erythema distal half and swelling; 

no open ulcer; able to move toes and ankle.  ), Calf Tenderness


Neurologic/Psych: Positive for: Alert, CNs II-XII, Oriented.  Negative for: 

Motor/Sensory Deficits (L arm 4/5strength), Aphasia





- Laboratory Results


Result Diagrams: 


 03/24/18 11:29





 03/24/18 11:29


Interpretation Of Abn Labs: 30 bun; lactate 2.3





- ECG


ECG: Positive for: Interpreted By Me, Viewed By Me


ECG Rhythm: Positive for: Normal QRS, Normal ST Segment, Sinus Rhythm


O2 Sat by Pulse Oximetry: 99


Pulse Ox Interpretation: Normal





- Radiology


X-Ray: Read By Radiologist


X-Ray Interpretation: No Acute Disease





- CT Scan/US


  ** sono


Other Rad Studies (CT/US): Read By Radiologist


Other Rad Interpretation: dvt





- Progress


ED Course And Treament: 





Noncompressible thrombus within the right popliteal and right posterior tibial 

veins.











1354:  Stable.  AAOx3.  Pain controlled.  Podiatry saw pt.  States for foot 

they recommend anti-fungal and keflex.   Pt. with no recent fall, head injury, 

bleeding.  Will give lovenox after discussion with Dr. Saldaña.  He will admit.  

Wants CTA.  Does not meet sepsis criteria.  





Disposition





- Clinical Impression


Clinical Impression: 


 DVT (deep venous thrombosis), Dehydration, Cellulitis of foot








- Patient ED Disposition


Is Patient to be Admitted: Yes


Counseled Patient/Family Regarding: Studies Performed, Diagnosis





- Disposition


Disposition Time: 14:17


Condition: FAIR





- Pt Status Changed To:


Hospital Disposition Of: Inpatient





- Admit Certification


Admit to Inpatient:: After my assessment, the patient will require 

hospitalization for at least two midnights.  This is because of the severity of 

symptoms shown, intensity of services needed, and/or the medical risk in this 

patient being treated as an outpatient.





- POA


Present On Arrival: Deep Vein Thrombosis / PE

## 2018-03-25 LAB
ALBUMIN SERPL-MCNC: 3.2 G/DL (ref 3.5–5)
ALBUMIN/GLOB SERPL: 1.1 {RATIO} (ref 1–2.1)
ALT SERPL-CCNC: 27 U/L (ref 9–52)
APTT BLD: 37.2 SECONDS (ref 25.6–37.1)
AST SERPL-CCNC: 17 U/L (ref 14–36)
BUN SERPL-MCNC: 23 MG/DL (ref 7–17)
CALCIUM SERPL-MCNC: 8.6 MG/DL (ref 8.4–10.2)
ERYTHROCYTE [DISTWIDTH] IN BLOOD BY AUTOMATED COUNT: 15.3 % (ref 11.5–14.5)
GFR NON-AFRICAN AMERICAN: > 60
HDLC SERPL-MCNC: 31 MG/DL (ref 30–70)
HGB BLD-MCNC: 12.2 G/DL (ref 12–16)
INR PPP: 1 (ref 0.9–1.2)
LDLC SERPL-MCNC: 86 MG/DL (ref 0–129)
MCH RBC QN AUTO: 26.6 PG (ref 27–31)
MCHC RBC AUTO-ENTMCNC: 32.4 G/DL (ref 33–37)
MCV RBC AUTO: 81.9 FL (ref 81–99)
PLATELET # BLD: 276 K/UL (ref 130–400)
PROTHROMBIN TIME: 11.6 SECONDS (ref 9.8–13.1)
RBC # BLD AUTO: 4.58 MIL/UL (ref 3.8–5.2)
T4 SERPL-MCNC: 8.45 UG/DL (ref 5.5–11)
WBC # BLD AUTO: 6.7 K/UL (ref 4.8–10.8)

## 2018-03-25 RX ADMIN — INSULIN LISPRO SCH UNITS: 100 INJECTION, SOLUTION INTRAVENOUS; SUBCUTANEOUS at 08:48

## 2018-03-25 RX ADMIN — INSULIN LISPRO SCH: 100 INJECTION, SOLUTION INTRAVENOUS; SUBCUTANEOUS at 18:08

## 2018-03-25 RX ADMIN — INSULIN LISPRO SCH UNITS: 100 INJECTION, SOLUTION INTRAVENOUS; SUBCUTANEOUS at 06:51

## 2018-03-25 RX ADMIN — INSULIN LISPRO SCH UNITS: 100 INJECTION, SOLUTION INTRAVENOUS; SUBCUTANEOUS at 13:46

## 2018-03-25 RX ADMIN — ENOXAPARIN SODIUM SCH MG: 80 INJECTION SUBCUTANEOUS at 04:26

## 2018-03-25 RX ADMIN — INSULIN LISPRO SCH: 100 INJECTION, SOLUTION INTRAVENOUS; SUBCUTANEOUS at 22:00

## 2018-03-25 RX ADMIN — ENOXAPARIN SODIUM SCH MG: 80 INJECTION SUBCUTANEOUS at 18:11

## 2018-03-25 RX ADMIN — INSULIN LISPRO SCH UNITS: 100 INJECTION, SOLUTION INTRAVENOUS; SUBCUTANEOUS at 13:49

## 2018-03-25 NOTE — CARD
--------------- APPROVED REPORT --------------





EKG Measurement

Heart [id number]

LA 170P78

[id number]

[id number]

[id number]



<Conclusion>

Normal sinus rhythm

Cannot exclude old inferior wall infarct

## 2018-03-25 NOTE — CP.PCM.PN
Subjective





- Date & Time of Evaluation


Date of Evaluation: 03/25/18


Time of Evaluation: 13:00





- Subjective


Subjective: 





Podiatry Progress Note- Dr. Cordero





83 y.o female with PMH of HTN, DM, anxiety with DVT and improving cellulitis 

with tinea pedis seen and evaluated at bedside. Daughter is seen with patient 

at bedside. PPatient is seen resting comfortably in bed, in NAD, and AA0x3. 

Denies acute overnight events. Denies nausea, fever, shortness of breath, chest 

pain, chills or fever. Denies calf pain or tenderness. LE elevated on pillows 

at bedside. Patient reports that the multipodus boots are too hot to wear. 

Reports swelling and redness has gone down. The burning pain still present.





Objective





- Vital Signs/Intake and Output


Vital Signs (last 24 hours): 


 











Temp Pulse Resp BP Pulse Ox


 


 97.6 F   69   18   163/69 H  98 


 


 03/25/18 12:28  03/25/18 12:28  03/25/18 12:28  03/25/18 12:28  03/25/18 12:28











- Medications


Medications: 


 Current Medications





Acetaminophen (Tylenol 325mg Tab)  650 mg PO Q4 PRN


   PRN Reason: Pain, Mild (1-3)


   Last Admin: 03/25/18 04:25 Dose:  650 mg


Alprazolam (Xanax)  0.5 mg PO Q12 [facility]


   Stop: 03/31/18 21:01


   Last Admin: 03/25/18 08:42 Dose:  0.5 mg


Aspirin (Ecotrin)  81 mg PO DAILY [facility]


   Last Admin: 03/25/18 08:45 Dose:  81 mg


Carvedilol (Coreg)  3.125 mg PO BID [facility]


   Last Admin: 03/25/18 08:45 Dose:  3.125 mg


Clotrimazole (Lotrimin 1% Cream)  1 applic TOP BID [facility]


Enoxaparin Sodium (Lovenox)  70 mg SC Q12@0500,1700 [facility]


   PRN Reason: Protocol


   Last Admin: 03/25/18 04:26 Dose:  70 mg


Sodium Chloride (Sodium Chloride 0.9%)  1,000 mls @ 60 mls/hr IV .[id number] [facility]


   Stop: 03/25/18 20:15


   Last Admin: 03/24/18 22:09 Dose:  60 mls/hr


Cefazolin Sodium 1 gm/ Sodium (Chloride)  100 mls @ 100 mls/hr IVPB Q8H [facility]


   PRN Reason: Protocol


   Last Admin: 03/25/18 08:44 Dose:  100 mls/hr


Insulin Human Lispro (Humalog)  12 units SC TID [facility]


   Last Admin: 03/25/18 13:46 Dose:  12 units


Insulin Human Lispro (Humalog)  0 units SC ACHS [facility]


   PRN Reason: Protocol


   Last Admin: 03/25/18 13:49 Dose:  1 units


Losartan Potassium (Cozaar)  50 mg PO DAILY [facility]


   Last Admin: 03/25/18 08:45 Dose:  50 mg


Tramadol HCl (Ultram)  50 mg PO Q6 PRN


   PRN Reason: Pain, moderate (4-7)











- Labs


Labs: 


 





 03/25/18 06:04 





 03/25/18 06:04 





 











PT  11.6 Seconds (9.8-13.1)   03/25/18  06:04    


 


INR  1.0  (0.9-1.2)   03/25/18  06:04    


 


APTT  37.2 Seconds (25.6-37.1)  H D 03/25/18  06:04    














- Constitutional


Appears: Well, Non-toxic, No Acute Distress





- Extremities Exam


Extremities Exam: absent: Calf Tenderness


Additional comments: 





Vasc: DP and PT weakly palpable, temperature gradient WNL, no increase warmth 

noted to the right LE, CFT delayed x10 seconds


Ortho: severe pain with palpation to the foot below the ankle, no pain with 

palpation to the calf


Neuro: gross and protective sensation diminished


Derm: calluses at the plantar heel measuring approximately 1 x 1 cm, no 

underlying ulceration appreciated, no open lesions


erythema noted to the entire right foot below the ankle, mild erythema noted to 

the entire right LE-improved


scaly circular lesions noted to the bilaterally foot





- Neurological Exam


Neurological Exam: Alert, Awake, Oriented x3





Assessment and Plan





- Assessment and Plan (Free Text)


Assessment: 





83 y.o female with PMH of HTN, DM, anxiety with DVT and improving cellulitis 

with tinea pedis seen and evaluated at bedside.


Plan: 








Patient examined and evaluated


Discussed plan in detail with attending Dr. Cordero


Labs, charts, vitals reviewed


U/S reviewed- DVT of right LE


Foot X-ray no fracture noted, cystic changes noted to proximal right 3rd 

metatarsal


Lotrimin for tinea pedis


c/w abx for cellulitis


Podiatry will continue to follow in house

## 2018-03-25 NOTE — CP.PCM.HP
History of Present Illness





- History of Present Illness


History of Present Illness: 





CC:  Pain RLE.





84 y/o F, with multiple chronic medical conditions including Hx of DM, brought 

to ER [facility], [place] to be evaluated for pain to RLE with no relief.





Pt c/o of gradually increased pain in RLE for a week, states is aching, constant

, moderate to severe intensity of 8:10, also R foot pain with burning sensation 

associated to swelling, redness in , increased symptoms 2 days PTA.





Right THR 12-19-17 [facility]  [place] , Patient  was  tranferred  to [facility] 12-21-17 

for  Rehab. and  DD home 1-3-18 , there  after  at  home  She  developed ulcers

  R L heel with  great  difficulty with  Therapy  at  home ,  heels  ulcers 

were  treated  at  home with local care, Patient  was also   treated with   

Augmentin for  C-S positive  the  ulcers gradually healed and according  to 

Patient  and  Patient's  daughter  at  bedside  ulcers healed  earlier  this 

week , also She  took  Augmentin up to this  week





Worsening symptoms:  Showing DVT RLE on Ext U-S.





Aggravated factor:  Movements/exercise.





Pt denied: Fall, fever, chills, n/v/d, abdominal pain, CP, palpitations, syncope

, SOB,cough, sick contact, recent travel out of [place]. 





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

, R THR, O/A , Lumbago with previous Epidural blocks





Ext U-S:  Non compressible thrombus in the R popliteal and R posterior veins.





EKG: NSR.  Cannot exclude old inferior wall infarct. 





Foot, Hip/Pelv X-Ray:  No evidence of Fx of dislocations.





CT Chest:  Minor localized borderline bronchiectasis RLL.


























Present on Admission





- Present on Admission


Any Indicators Present on Admission: Yes


History of DVT/PE: Yes





Review of Systems





- Constitutional


Constitutional: Weakness





- EENT


Eyes: Other (negative)


Ears: Other (negative)


Nose/Mouth/Throat: Other (negative)





- Cardiovascular


Cardiovascular: Other (negative)





- Respiratory


Respiratory: Other (negative)





- Gastrointestinal


Gastrointestinal: Other (negative)





- Genitourinary


Genitourinary: Other (negative)





- Musculoskeletal


Musculoskeletal: Arthralgias, Back Pain, Other (pain RLE/ foot)





- Integumentary


Integumentary: Swelling (R foot)





- Neurological


Neurological: Weakness (LUE)





- Psychiatric


Psychiatric: Anxiety





- Endocrine


Endocrine: Other (negative)





- Hematologic/Lymphatic


Hematologic: Other (negative)





Past Patient History





- Infectious Disease


Hx of Infectious Diseases: None





- Tetanus Immunizations


Tetanus Immunization: Unknown





- Past Medical History & Family History


Past Medical History?: Yes


Pertinent Family History: 


Son: O/A, TKR





- Past Social History


Smoking Status: Never Smoked


Alcohol: None


Drugs: Denies


Home Situation {Lives}: With Family





- CARDIAC


Hx Cardiac Disorders: Yes (HTN)


Hx Hypertension: Yes





- PULMONARY


Hx Respiratory Disorders: Yes


Other/Comment: Hx  Allergic  Rhinitis





- NEUROLOGICAL


Hx Neurological Disorder: Yes


HX Cerebrovascular Accident: Yes (R CVA 2013)





- HEENT


Hx HEENT Problems: Yes


Hx Cataracts: Yes


Other/Comment: Allergic  Rhinitis





- RENAL


Hx Chronic Kidney Disease: No


Other/Comment: Recurrent UTI's





- ENDOCRINE/METABOLIC


Hx Endocrine Disorders: Yes (DM)





- HEMATOLOGICAL/ONCOLOGICAL


Hx Blood Disorders: Yes (DVT)


Other/Comment: Arterial embolus LUE with embolectomy L subclavian 2016





- INTEGUMENTARY


Hx Dermatological Problems: No





- MUSCULOSKELETAL/RHEUMATOLOGICAL


Hx Musculoskeletal Disorders: Yes


Hx Arthritis: Yes


Hx Back Pain: Yes


Hx Falls: No


Other/Comment: Lumbago , treated with Epidural block





- GASTROINTESTINAL


Hx Gastritis: Yes (Hiatus  Hernia)





- GENITOURINARY/GYNECOLOGICAL


Hx Genitourinary Disorders: Yes (UTI recurrent)





- PSYCHIATRIC


Hx Anxiety: Yes


Hx Substance Use: No





- SURGICAL HISTORY


Hx Cholecystectomy: Yes


Hx Orthopedic Surgery: Yes (Right THR)


Other/Comment: LUE  Arterial Embolectomy 2016





- ANESTHESIA


Hx Anesthesia: Yes


Hx Anesthesia Reactions: No


Hx Malignant Hyperthermia: No





Meds


Allergies/Adverse Reactions: 


 Allergies











Allergy/AdvReac Type Severity Reaction Status Date / Time


 


No Known Allergies Allergy   Verified 03/24/18 09:55














Physical Exam





- Constitutional


Appears: Chronically Ill





- Head Exam


Head Exam: NORMAL INSPECTION





- Eye Exam


Eye Exam: PERRL





- ENT Exam


ENT Exam: Normal Exam





- Neck Exam


Neck exam: Positive for: Normal Inspection





- Respiratory Exam


Respiratory Exam: NORMAL BREATHING PATTERN





- Cardiovascular Exam


Cardiovascular Exam: REGULAR RHYTHM, Systolic Murmur (1/6 LSB)





- GI/Abdominal Exam


GI & Abdominal Exam: Normal Bowel Sounds, Soft





- Extremities Exam


Additional comments: 


redness with tenderness distal dorsum  R  foot  and  toes , healed ulcers   R L 

heel 





- Back Exam


Back exam: NORMAL INSPECTION





- Neurological Exam


Neurological exam: Alert, CN II-XII Intact, Oriented x3


Additional comments: 





weakness LUE  rest no focal motor/sensory deficit





- Psychiatric Exam


Psychiatric exam: Anxious





- Skin


Skin Exam: Warm (Cellulitis RLE)





Results





- Vital Signs


Recent Vital Signs: 





 Last Vital Signs











Temp  97.6 F   03/25/18 17:10


 


Pulse  74   03/25/18 17:10


 


Resp  18   03/25/18 17:10


 


BP  152/65 H  03/25/18 18:06


 


Pulse Ox  98   03/25/18 17:10








reviewed JOYCE





- Labs


Result Diagrams: 


 03/26/18 04:20





 03/26/18 04:20


Labs: 





 Laboratory Results - last 24 hr











  03/24/18 03/25/18 03/25/18





  21:25 05:48 06:04


 


WBC    6.7


 


RBC    4.58


 


Hgb    12.2


 


Hct    37.5


 


MCV    81.9


 


MCH    26.6 L


 


MCHC    32.4 L


 


RDW    15.3 H


 


Plt Count    276


 


PT   


 


INR   


 


APTT   


 


Sodium   


 


Potassium   


 


Chloride   


 


Carbon Dioxide   


 


Anion Gap   


 


BUN   


 


Creatinine   


 


Est GFR ( Amer)   


 


Est GFR (Non-Af Amer)   


 


POC Glucose (mg/dL)  235 H  256 H 


 


Random Glucose   


 


Hemoglobin A1c   


 


Calcium   


 


Total Bilirubin   


 


AST   


 


ALT   


 


Alkaline Phosphatase   


 


Total Protein   


 


Albumin   


 


Globulin   


 


Albumin/Globulin Ratio   


 


Triglycerides   


 


Cholesterol   


 


LDL Cholesterol Direct   


 


HDL Cholesterol   


 


Thyroxine (T4)   


 


TSH 3rd Generation   














  03/25/18 03/25/18 03/25/18





  06:04 06:04 06:04


 


WBC   


 


RBC   


 


Hgb   


 


Hct   


 


MCV   


 


MCH   


 


MCHC   


 


RDW   


 


Plt Count   


 


PT  11.6  


 


INR  1.0  


 


APTT  37.2 H D  


 


Sodium   140 


 


Potassium   4.4 


 


Chloride   106 


 


Carbon Dioxide   24 


 


Anion Gap   14 


 


BUN   23 H 


 


Creatinine   0.7 


 


Est GFR ( Amer)   > 60 


 


Est GFR (Non-Af Amer)   > 60 


 


POC Glucose (mg/dL)   


 


Random Glucose   277 H 


 


Hemoglobin A1c    9.3 H


 


Calcium   8.6 


 


Total Bilirubin   0.4 


 


AST   17 


 


ALT   27 


 


Alkaline Phosphatase   109 


 


Total Protein   6.1 L 


 


Albumin   3.2 L 


 


Globulin   2.9 


 


Albumin/Globulin Ratio   1.1 


 


Triglycerides   222 H 


 


Cholesterol   156 


 


LDL Cholesterol Direct   86 


 


HDL Cholesterol   31 


 


Thyroxine (T4)   8.45 


 


TSH 3rd Generation   1.66 














  03/25/18 03/25/18





  11:10 16:39


 


WBC  


 


RBC  


 


Hgb  


 


Hct  


 


MCV  


 


MCH  


 


MCHC  


 


RDW  


 


Plt Count  


 


PT  


 


INR  


 


APTT  


 


Sodium  


 


Potassium  


 


Chloride  


 


Carbon Dioxide  


 


Anion Gap  


 


BUN  


 


Creatinine  


 


Est GFR ( Amer)  


 


Est GFR (Non-Af Amer)  


 


POC Glucose (mg/dL)  188 H  118 H


 


Random Glucose  


 


Hemoglobin A1c  


 


Calcium  


 


Total Bilirubin  


 


AST  


 


ALT  


 


Alkaline Phosphatase  


 


Total Protein  


 


Albumin  


 


Globulin  


 


Albumin/Globulin Ratio  


 


Triglycerides  


 


Cholesterol  


 


LDL Cholesterol Direct  


 


HDL Cholesterol  


 


Thyroxine (T4)  


 


TSH 3rd Generation  








reviewed J.P.





- EKG Data


EKG comments: 





reviewed J.P.





- Imaging and Cardiology


  ** Chest x-ray


Status: Report reviewed by me (JOYCE)





  ** Venous US


Status: Report reviewed by me (LINSEYPAsuncion)





  ** CT scan - chest


Status: Report reviewed by me (LINSEYPAsuncion)


Additional comment: 





R foot/Hip/Pelvis X-Ray:  Reviwed J.P.





Assessment & Plan


(1) Cellulitis of right lower extremity


Status: Acute   Priority: High   


Comment: R foot   





(2) Deep vein thrombosis (DVT) of right lower extremity


Status: Acute   Priority: High   


Comment: R Popliteal and  R posterior tibial veins   





(3) HTN (hypertension)


Status: Chronic   Priority: Medium   





(4) Anxiety


Status: Chronic   Priority: Medium   





(5) Status post total hip replacement, right


Status: Chronic   Priority: Medium   





(6) History of arterial embolism


Status: Chronic   Priority: Medium   


Comment: ALISSA   





(7) Diabetes mellitus


Status: Chronic   


Comment: uncontrolled   





(8) Hyperglycemia


Status: Acute   





- Assessment and Plan (Free Text)


Plan: 





Lovenox , Ancef BS  control , Endoctine consult , PT





- Date & Time


Date: 03/25/18


Time: 14:30

## 2018-03-26 VITALS — RESPIRATION RATE: 18 BRPM

## 2018-03-26 LAB
ALBUMIN SERPL-MCNC: 2.9 G/DL (ref 3.5–5)
ALBUMIN/GLOB SERPL: 1.1 {RATIO} (ref 1–2.1)
ALT SERPL-CCNC: 18 U/L (ref 9–52)
APTT BLD: 29.6 SECONDS (ref 25.6–37.1)
AST SERPL-CCNC: 27 U/L (ref 14–36)
BUN SERPL-MCNC: 19 MG/DL (ref 7–17)
CALCIUM SERPL-MCNC: 8.3 MG/DL (ref 8.4–10.2)
ERYTHROCYTE [DISTWIDTH] IN BLOOD BY AUTOMATED COUNT: 15 % (ref 11.5–14.5)
GFR NON-AFRICAN AMERICAN: > 60
HGB BLD-MCNC: 11.7 G/DL (ref 12–16)
INR PPP: 1.1 (ref 0.9–1.2)
MCH RBC QN AUTO: 26.6 PG (ref 27–31)
MCHC RBC AUTO-ENTMCNC: 32.5 G/DL (ref 33–37)
MCV RBC AUTO: 81.8 FL (ref 81–99)
PLATELET # BLD: 296 K/UL (ref 130–400)
PROTHROMBIN TIME: 11.7 SECONDS (ref 9.8–13.1)
RBC # BLD AUTO: 4.4 MIL/UL (ref 3.8–5.2)
WBC # BLD AUTO: 7.4 K/UL (ref 4.8–10.8)

## 2018-03-26 RX ADMIN — INSULIN LISPRO SCH: 100 INJECTION, SOLUTION INTRAVENOUS; SUBCUTANEOUS at 17:27

## 2018-03-26 RX ADMIN — INSULIN LISPRO SCH: 100 INJECTION, SOLUTION INTRAVENOUS; SUBCUTANEOUS at 22:05

## 2018-03-26 RX ADMIN — INSULIN LISPRO SCH UNITS: 100 INJECTION, SOLUTION INTRAVENOUS; SUBCUTANEOUS at 17:27

## 2018-03-26 RX ADMIN — INSULIN LISPRO SCH UNITS: 100 INJECTION, SOLUTION INTRAVENOUS; SUBCUTANEOUS at 13:26

## 2018-03-26 RX ADMIN — INSULIN LISPRO SCH UNITS: 100 INJECTION, SOLUTION INTRAVENOUS; SUBCUTANEOUS at 08:37

## 2018-03-26 RX ADMIN — INSULIN LISPRO SCH: 100 INJECTION, SOLUTION INTRAVENOUS; SUBCUTANEOUS at 11:47

## 2018-03-26 RX ADMIN — ENOXAPARIN SODIUM SCH MG: 80 INJECTION SUBCUTANEOUS at 05:55

## 2018-03-26 RX ADMIN — INSULIN LISPRO SCH UNITS: 100 INJECTION, SOLUTION INTRAVENOUS; SUBCUTANEOUS at 06:46

## 2018-03-26 NOTE — CP.PCM.PN
Subjective





- Date & Time of Evaluation


Date of Evaluation: 03/26/18


Time of Evaluation: 13:00





- Subjective


Subjective: 








Podiatry Progress Note- Dr. Cordero





83 y.o female with PMH of HTN, DM, anxiety with DVT and improving cellulitis 

with tinea pedis seen and evaluated at bedside.Patient is seen resting 

comfortably in bed, in NAD, and AA0x3. Denies acute overnight events. Denies 

nausea, fever, shortness of breath, chest pain, chills or fever. Denies calf 

pain or tenderness. Reports swelling and redness has gone down. The burning 

pain still present.





Objective





- Vital Signs/Intake and Output


Vital Signs (last 24 hours): 


 











Temp Pulse Resp BP Pulse Ox


 


 98.4 F   68   18   128/75   98 


 


 03/26/18 13:00  03/26/18 13:00  03/26/18 13:00  03/26/18 13:00  03/26/18 13:45











- Medications


Medications: 


 Current Medications





Acetaminophen (Tylenol 325mg Tab)  650 mg PO Q4 PRN


   PRN Reason: Pain, Mild (1-3)


   Last Admin: 03/25/18 04:25 Dose:  650 mg


Alprazolam (Xanax)  0.5 mg PO Q12 [facility]


   Stop: 03/31/18 21:01


   Last Admin: 03/26/18 08:38 Dose:  0.5 mg


Apixaban (Eliquis)  5 mg PO BID@0500,1700 [facility]


   PRN Reason: Protocol


Aspirin (Ecotrin)  81 mg PO DAILY [facility]


   Last Admin: 03/26/18 08:35 Dose:  81 mg


Carvedilol (Coreg)  3.125 mg PO BID [facility]


   Last Admin: 03/26/18 08:34 Dose:  3.125 mg


Clotrimazole (Lotrimin 1% Cream)  1 applic TOP BID [facility]


   Last Admin: 03/26/18 08:37 Dose:  1 applic


Fluconazole (Diflucan)  100 mg PO DAILY [facility]


   PRN Reason: Protocol


   Stop: 03/28/18 11:00


   Last Admin: 03/26/18 08:35 Dose:  100 mg


Cefazolin Sodium 1 gm/ Sodium (Chloride)  100 mls @ 100 mls/hr IVPB Q8H [facility]


   PRN Reason: Protocol


   Last Admin: 03/26/18 08:33 Dose:  100 mls/hr


Insulin Detemir (Levemir)  8 units SC HS UGO


Insulin Human Lispro (Humalog)  8 units SC AC UGO


Insulin Human Lispro (Humalog)  0 units SC ACHS UGO


Losartan Potassium (Cozaar)  50 mg PO DAILY [facility]


   Last Admin: 03/26/18 08:34 Dose:  50 mg


Tramadol HCl (Ultram)  50 mg PO Q6 PRN


   PRN Reason: Pain, moderate (4-7)


   Last Admin: 03/25/18 21:22 Dose:  50 mg











- Labs


Labs: 


 





 03/26/18 04:20 





 03/26/18 04:20 





 











PT  11.7 Seconds (9.8-13.1)   03/26/18  04:20    


 


INR  1.1  (0.9-1.2)   03/26/18  04:20    


 


APTT  29.6 Seconds (25.6-37.1)  D 03/26/18  04:20    














- Constitutional


Appears: Well, Non-toxic, No Acute Distress





- Extremities Exam


Extremities Exam: absent: Calf Tenderness


Additional comments: 








Vasc: DP and PT weakly palpable, temperature gradient WNL, no increase warmth 

noted to the right LE, CFT delayed x10 seconds


Ortho: severe pain with palpation to the foot below the ankle, no pain with 

palpation to the calf


Neuro: gross and protective sensation diminished


Derm: calluses at the plantar heel measuring approximately 1 x 1 cm, no 

underlying ulceration appreciated, no open lesions


erythema noted to the entire right foot below the ankle, mild erythema noted to 

the entire right LE-improved


scaly circular lesions noted to the bilaterally foot








- Neurological Exam


Neurological Exam: Alert, Awake, Oriented x3





- Psychiatric Exam


Psychiatric exam: Normal Affect, Normal Mood





Assessment and Plan





- Assessment and Plan (Free Text)


Assessment: 





83 y.o female with PMH of HTN, DM, anxiety with DVT and improving cellulitis 

with tinea pedis seen and evaluated at bedside.


Plan: 


Patient examined and evaluated


Discussed plan in detail with attending Dr. Cordero


Labs, charts, vitals reviewed


U/S reviewed- DVT of right LE


DVT treatment per primary team


Foot X-ray no fracture noted, cystic changes noted to proximal right 3rd 

metatarsal


Ordered uric acid to r/o gout


Lotrimin for tinea pedis


c/w abx for cellulitis


Patient stable per podiatry standpoint with resolving cellulitis


Patient can follow up with Dr. Cordero as an outpatient within 1 week upon 

discharge


Please call to make an appointment

## 2018-03-26 NOTE — CP.PCM.PN
Subjective





- Date & Time of Evaluation


Date of Evaluation: 03/26/18


Time of Evaluation: 11:20





- Subjective


Subjective: 





F/U Cellulitis RLE





Objective





- Vital Signs/Intake and Output


Vital Signs (last 24 hours): 


 











Temp Pulse Resp BP Pulse Ox


 


 98.3 F   68   16   133/74   98 


 


 03/26/18 15:58  03/26/18 17:17  03/26/18 15:58  03/26/18 17:17  03/26/18 15:58











- Medications


Medications: 


 Current Medications





Acetaminophen (Tylenol 325mg Tab)  650 mg PO Q4 PRN


   PRN Reason: Pain, Mild (1-3)


   Last Admin: 03/25/18 04:25 Dose:  650 mg


Alprazolam (Xanax)  0.5 mg PO [facility]12 [facility]


   Stop: 03/31/18 21:01


   Last Admin: 03/26/18 08:38 Dose:  0.5 mg


Apixaban (Eliquis)  5 mg PO BID@0500,1700 [facility]


   PRN Reason: Protocol


Aspirin (Ecotrin)  81 mg PO DAILY [facility]


   Last Admin: 03/26/18 08:35 Dose:  81 mg


Carvedilol (Coreg)  3.125 mg PO BID [facility]


   Last Admin: 03/26/18 17:17 Dose:  3.125 mg


Clotrimazole (Lotrimin 1% Cream)  1 applic TOP BID [facility]


   Last Admin: 03/26/18 08:37 Dose:  1 applic


Fluconazole (Diflucan)  100 mg PO DAILY [facility]


   PRN Reason: Protocol


   Stop: 03/28/18 11:00


   Last Admin: 03/26/18 08:35 Dose:  100 mg


Cefazolin Sodium 1 gm/ Sodium (Chloride)  100 mls @ 100 mls/hr IVPB Q8H [facility]


   PRN Reason: Protocol


   Last Admin: 03/26/18 17:17 Dose:  100 mls/hr


Insulin Detemir (Levemir)  8 units SC HS UGO


Insulin Human Lispro (Humalog)  8 units SC AC UGO


Insulin Human Lispro (Humalog)  0 units SC ACHS [facility]


Losartan Potassium (Cozaar)  50 mg PO DAILY [facility]


   Last Admin: 03/26/18 08:34 Dose:  50 mg


Tramadol HCl (Ultram)  50 mg PO Q6 PRN


   PRN Reason: Pain, moderate (4-7)


   Last Admin: 03/25/18 21:22 Dose:  50 mg











- Labs


Labs: 


 





 03/26/18 04:20 





 03/26/18 04:20 





 











PT  11.7 Seconds (9.8-13.1)   03/26/18  04:20    


 


INR  1.1  (0.9-1.2)   03/26/18  04:20    


 


APTT  29.6 Seconds (25.6-37.1)  D 03/26/18  04:20    














- Constitutional


Appears: Chronically Ill





- Head Exam


Head Exam: NORMAL INSPECTION





- Eye Exam


Eye Exam: PERRL





- ENT Exam


ENT Exam: Normal Exam





- Neck Exam


Neck Exam: Normal Inspection





- Respiratory Exam


Respiratory Exam: NORMAL BREATHING PATTERN





- Cardiovascular Exam


Cardiovascular Exam: REGULAR RHYTHM, Murmur (systolic 1/6 LSB)





- GI/Abdominal Exam


GI & Abdominal Exam: Soft, Normal Bowel Sounds





- Extremities Exam


Extremities Exam: Tenderness (and resdness distal dorsum T foot and toes, 

healed ulces R-L heel.)





- Back Exam


Back Exam: NORMAL INSPECTION





- Neurological Exam


Neurological Exam: Alert, CN II-XII Intact, Oriented x3


Additional comments: 





Weakness LUE, rest no focal motor/sensory deficit





- Psychiatric Exam


Psychiatric exam: Anxious





- Skin


Skin Exam: Warm (Cellulitis RLE)





Assessment and Plan


(1) Cellulitis of right lower extremity


Status: Acute   





(2) Deep vein thrombosis (DVT) of right lower extremity


Status: Acute   





(3) HTN (hypertension)


Status: Chronic   





(4) Anxiety


Status: Chronic   





(5) Status post total hip replacement, right


Status: Chronic   





(6) History of arterial embolism


Status: Chronic   





(7) Diabetes mellitus


Status: Chronic   





(8) Hyperglycemia


Status: Acute

## 2018-03-27 ENCOUNTER — HOSPITAL ENCOUNTER (INPATIENT)
Dept: HOSPITAL 14 - H.TCU | Age: 83
LOS: 8 days | Discharge: HOME HEALTH | DRG: 603 | End: 2018-04-04
Attending: INTERNAL MEDICINE | Admitting: INTERNAL MEDICINE
Payer: COMMERCIAL

## 2018-03-27 VITALS
TEMPERATURE: 98 F | OXYGEN SATURATION: 100 % | DIASTOLIC BLOOD PRESSURE: 70 MMHG | HEART RATE: 66 BPM | SYSTOLIC BLOOD PRESSURE: 151 MMHG

## 2018-03-27 VITALS — BODY MASS INDEX: 27 KG/M2

## 2018-03-27 DIAGNOSIS — E11.65: ICD-10-CM

## 2018-03-27 DIAGNOSIS — F41.9: ICD-10-CM

## 2018-03-27 DIAGNOSIS — M19.90: ICD-10-CM

## 2018-03-27 DIAGNOSIS — L03.115: Primary | ICD-10-CM

## 2018-03-27 DIAGNOSIS — M77.31: ICD-10-CM

## 2018-03-27 DIAGNOSIS — E11.51: ICD-10-CM

## 2018-03-27 DIAGNOSIS — Z96.641: ICD-10-CM

## 2018-03-27 DIAGNOSIS — I69.359: ICD-10-CM

## 2018-03-27 DIAGNOSIS — I82.401: ICD-10-CM

## 2018-03-27 DIAGNOSIS — E11.621: ICD-10-CM

## 2018-03-27 DIAGNOSIS — Z87.440: ICD-10-CM

## 2018-03-27 DIAGNOSIS — I10: ICD-10-CM

## 2018-03-27 DIAGNOSIS — B35.3: ICD-10-CM

## 2018-03-27 DIAGNOSIS — L97.519: ICD-10-CM

## 2018-03-27 DIAGNOSIS — E78.00: ICD-10-CM

## 2018-03-27 PROCEDURE — F08Z4FZ HOME MANAGEMENT TREATMENT USING ASSISTIVE, ADAPTIVE, SUPPORTIVE OR PROTECTIVE EQUIPMENT: ICD-10-PCS | Performed by: INTERNAL MEDICINE

## 2018-03-27 PROCEDURE — 3E03329 INTRODUCTION OF OTHER ANTI-INFECTIVE INTO PERIPHERAL VEIN, PERCUTANEOUS APPROACH: ICD-10-PCS | Performed by: INTERNAL MEDICINE

## 2018-03-27 PROCEDURE — F07Z9FZ GAIT TRAINING/FUNCTIONAL AMBULATION TREATMENT USING ASSISTIVE, ADAPTIVE, SUPPORTIVE OR PROTECTIVE EQUIPMENT: ICD-10-PCS | Performed by: INTERNAL MEDICINE

## 2018-03-27 RX ADMIN — INSULIN LISPRO SCH: 100 INJECTION, SOLUTION INTRAVENOUS; SUBCUTANEOUS at 08:33

## 2018-03-27 RX ADMIN — INSULIN DETEMIR SCH UNITS: 100 INJECTION, SOLUTION SUBCUTANEOUS at 21:09

## 2018-03-27 RX ADMIN — INSULIN LISPRO SCH UNITS: 100 INJECTION, SOLUTION INTRAVENOUS; SUBCUTANEOUS at 13:59

## 2018-03-27 RX ADMIN — INSULIN LISPRO SCH: 100 INJECTION, SOLUTION INTRAVENOUS; SUBCUTANEOUS at 06:31

## 2018-03-27 NOTE — CP.PCM.DIS
Provider





- Provider


Date of Admission: 


03/24/18 14:12





Attending physician: 


Bonifacio Saldaña MD








Diagnosis





- Discharge Diagnosis


(1) Cellulitis of right lower extremity


Status: Acute   Priority: High   





(2) Deep vein thrombosis (DVT) of right lower extremity


Status: Acute   Priority: High   





(3) HTN (hypertension)


Status: Chronic   Priority: Medium   





(4) Anxiety


Status: Chronic   Priority: Medium   





(5) Status post total hip replacement, right


Status: Chronic   Priority: Medium   





(6) History of arterial embolism


Status: Chronic   Priority: Medium   





(7) Diabetes mellitus


Status: Chronic   





(8) Hyperglycemia


Status: Acute   





Hospital Course





- Lab Results


Lab Results: 


 Micro Results





03/24/18 12:00   Blood   Blood Culture - Preliminary


                            NO GROWTH AFTER 3 DAYS


03/24/18 11:20   Blood   Blood Culture - Preliminary


                            NO GROWTH AFTER 3 DAYS


03/24/18 14:01   Urine   Urine Culture - Final


                            MULTIPLE SPECIES. SUGGEST REPEAT SPECIMEN.





 Most Recent Lab Values











WBC  7.4 K/uL (4.8-10.8)   03/26/18  04:20    


 


RBC  4.40 Mil/uL (3.80-5.20)   03/26/18  04:20    


 


Hgb  11.7 g/dL (12.0-16.0)  L  03/26/18  04:20    


 


Hct  36.0 % (34.0-47.0)   03/26/18  04:20    


 


MCV  81.8 fl (81.0-99.0)   03/26/18  04:20    


 


MCH  26.6 pg (27.0-31.0)  L  03/26/18  04:20    


 


MCHC  32.5 g/dL (33.0-37.0)  L  03/26/18  04:20    


 


RDW  15.0 % (11.5-14.5)  H  03/26/18  04:20    


 


Plt Count  296 K/uL (130-400)   03/26/18  04:20    


 


MPV  8.0 fl (7.2-11.7)   03/24/18  11:29    


 


Neut % (Auto)  61.8 % (50.0-75.0)   03/24/18  11:29    


 


Lymph % (Auto)  28.3 % (20.0-40.0)   03/24/18  11:29    


 


Mono % (Auto)  6.1 % (0.0-10.0)   03/24/18  11:29    


 


Eos % (Auto)  2.5 % (0.0-4.0)   03/24/18  11:29    


 


Baso % (Auto)  1.3 % (0.0-2.0)   03/24/18  11:29    


 


Neut # (Auto)  4.9 K/uL (1.8-7.0)   03/24/18  11:29    


 


Lymph # (Auto)  2.3 K/uL (1.0-4.3)   03/24/18  11:29    


 


Mono # (Auto)  0.5 K/uL (0.0-0.8)   03/24/18  11:29    


 


Eos # (Auto)  0.2 K/uL (0.0-0.7)   03/24/18  11:29    


 


Baso # (Auto)  0.1 K/uL (0.0-0.2)   03/24/18  11:29    


 


PT  11.7 Seconds (9.8-13.1)   03/26/18  04:20    


 


INR  1.1  (0.9-1.2)   03/26/18  04:20    


 


APTT  29.6 Seconds (25.6-37.1)  D 03/26/18  04:20    


 


pO2  31 mm/Hg (30-55)   03/24/18  10:32    


 


VBG pH  7.36  (7.32-7.43)   03/24/18  10:32    


 


VBG pCO2  50 mmHg (40-60)   03/24/18  10:32    


 


VBG HCO3  25.5 mmol/L  03/24/18  10:32    


 


VBG Total CO2  29.7 mmol/L (22-28)  H  03/24/18  10:32    


 


VBG O2 Sat (Calc)  66.2 % (40-65)  H  03/24/18  10:32    


 


VBG Base Excess  1.9 mmol/L (0.0-2.0)   03/24/18  10:32    


 


VBG Potassium  4.3 mmol/L (3.6-5.2)   03/24/18  10:32    


 


Sodium  138.0 mmol/L (132-148)   03/24/18  10:32    


 


Chloride  106.0 mmol/L ()   03/24/18  10:32    


 


Glucose  282 mg/dL ()  H  03/24/18  10:32    


 


Lactate  2.3 mmol/L (0.7-2.1)  H  03/24/18  10:32    


 


FiO2  21.0 %  03/24/18  10:32    


 


Sodium  137 mmol/l (132-148)   03/26/18  04:20    


 


Potassium  4.3 MMOL/L (3.6-5.0)   03/26/18  04:20    


 


Chloride  104 mmol/L ()   03/26/18  04:20    


 


Carbon Dioxide  25 mmol/L (22-30)   03/26/18  04:20    


 


Anion Gap  12  (10-20)   03/26/18  04:20    


 


BUN  19 mg/dl (7-17)  H  03/26/18  04:20    


 


Creatinine  0.8 mg/dl (0.7-1.2)   03/26/18  04:20    


 


Est GFR ( Amer)  > 60   03/26/18  04:20    


 


Est GFR (Non-Af Amer)  > 60   03/26/18  04:20    


 


POC Glucose (mg/dL)  332 mg/dL ()  H  03/27/18  10:59    


 


Random Glucose  233 mg/dL ()  H  03/26/18  04:20    


 


Hemoglobin A1c  9.3 % (4.2-6.5)  H  03/25/18  06:04    


 


Calcium  8.3 mg/dL (8.4-10.2)  L  03/26/18  04:20    


 


Phosphorus  2.9 mg/dl (2.5-4.5)   03/24/18  11:29    


 


Magnesium  1.7 MG/DL (1.6-2.3)   03/24/18  11:29    


 


Total Bilirubin  0.2 mg/dl (0.2-1.3)   03/26/18  04:20    


 


AST  27 U/L (14-36)   03/26/18  04:20    


 


ALT  18 U/L (9-52)   03/26/18  04:20    


 


Alkaline Phosphatase  91 U/L ()   03/26/18  04:20    


 


Troponin I  < 0.0120 ng/mL (0.00-0.120)   03/24/18  11:29    


 


Total Protein  5.6 G/DL (6.3-8.2)  L  03/26/18  04:20    


 


Albumin  2.9 g/dL (3.5-5.0)  L  03/26/18  04:20    


 


Globulin  2.7 gm/dL (2.2-3.9)   03/26/18  04:20    


 


Albumin/Globulin Ratio  1.1  (1.0-2.1)   03/26/18  04:20    


 


Triglycerides  222 mg/DL (0-149)  H  03/25/18  06:04    


 


Cholesterol  156 mg/dL (0-199)   03/25/18  06:04    


 


LDL Cholesterol Direct  86 mg/dL (0-129)   03/25/18  06:04    


 


HDL Cholesterol  31 MG/DL (30-70)   03/25/18  06:04    


 


Thyroxine (T4)  8.45 ug/dl (5.5-11.0)   03/25/18  06:04    


 


TSH 3rd Generation  1.66 mIU/ML (0.46-4.68)   03/25/18  06:04    


 


Venous Blood Potassium  4.3 mmol/L (3.6-5.2)   03/24/18  10:32    


 


Urine Color  Yellow  (YELLOW)   03/24/18  14:01    


 


Urine Clarity  Slighty-cloudy  (Clear)   03/24/18  14:01    


 


Urine pH  6.0  (5.0-8.0)   03/24/18  14:01    


 


Ur Specific Gravity  1.012  (1.003-1.030)   03/24/18  14:01    


 


Urine Protein  Negative mg/dL (NEGATIVE)   03/24/18  14:01    


 


Urine Glucose (UA)  >=500 mg/dL (Normal)   03/24/18  14:01    


 


Urine Ketones  Negative mg/dL (NEGATIVE)   03/24/18  14:01    


 


Urine Blood  Negative  (NEGATIVE)   03/24/18  14:01    


 


Urine Nitrate  Negative  (NEGATIVE)   03/24/18  14:01    


 


Urine Bilirubin  Negative  (NEGATIVE)   03/24/18  14:01    


 


Urine Urobilinogen  0.2-1.0 mg/dL (0.2-1.0)   03/24/18  14:01    


 


Ur Leukocyte Esterase  Neg Basim/uL (Negative)   03/24/18  14:01    


 


Urine RBC (Auto)  1 /hpf (0-3)   03/24/18  14:01    


 


Urine Microscopic WBC  1 /hpf (0-5)   03/24/18  14:01    


 


Ur Squamous Epith Cells  1 /hpf (0-5)   03/24/18  14:01    














Discharge Exam





- Head Exam


Head Exam: NORMAL INSPECTION





Discharge Plan





- Discharge Medications


Prescriptions: 


ceFAZolin 1 gm in NS [Ancef 1GM in NS] 1 gm IV Q8 #10 bag





- Follow Up Plan


Condition: FAIR


Disposition: REHAB FACILITY/REHAB UNIT


Instructions:  Pulmonary Embolism (DC), Pulmonary Embolism (GEN), Deep Venous 

Thrombosis (DC), Deep Venous Thrombosis (GEN)

## 2018-03-27 NOTE — CP.PCM.PN
Objective





- Vital Signs/Intake and Output


Vital Signs (last 24 hours): 


 











Temp Pulse Resp BP Pulse Ox


 


 98 F   66   18   151/70 H  100 


 


 03/27/18 11:57  03/27/18 11:57  03/27/18 11:57  03/27/18 11:57  03/27/18 11:57











- Medications


Medications: 


 Current Medications





Acetaminophen (Tylenol 325mg Tab)  650 mg PO Q4 PRN


   PRN Reason: Pain, Mild (1-3)


   Last Admin: 03/25/18 04:25 Dose:  650 mg


Alprazolam (Xanax)  0.5 mg PO Q12 [facility]


   Stop: 03/31/18 21:01


   Last Admin: 03/27/18 08:34 Dose:  Not Given


Apixaban (Eliquis)  5 mg PO BID@0500,1700 [facility]


   PRN Reason: Protocol


   Last Admin: 03/27/18 04:38 Dose:  5 mg


Aspirin (Ecotrin)  81 mg PO DAILY [facility]


   Last Admin: 03/27/18 08:33 Dose:  81 mg


Carvedilol (Coreg)  3.125 mg PO BID [facility]


   Last Admin: 03/27/18 08:32 Dose:  3.125 mg


Clotrimazole (Lotrimin 1% Cream)  1 applic TOP BID [facility]


   Last Admin: 03/26/18 17:25 Dose:  1 applic


Dicyclomine HCl (Bentyl)  10 mg PO TID PRN


   PRN Reason: Other


Fluconazole (Diflucan)  100 mg PO DAILY [facility]


   PRN Reason: Protocol


   Stop: 03/28/18 11:00


   Last Admin: 03/27/18 08:32 Dose:  100 mg


Cefazolin Sodium 1 gm/ Sodium (Chloride)  100 mls @ 100 mls/hr IVPB Q8H [facility]


   PRN Reason: Protocol


   Last Admin: 03/27/18 08:29 Dose:  100 mls/hr


Insulin Detemir (Levemir)  8 units SC HS [facility]


   Last Admin: 03/26/18 22:05 Dose:  Not Given


Insulin Human Lispro (Humalog)  8 units SC AC [facility]


   Last Admin: 03/27/18 08:33 Dose:  Not Given


Insulin Human Lispro (Humalog)  0 units SC ACHS [facility]


   Last Admin: 03/27/18 06:31 Dose:  Not Given


Losartan Potassium (Cozaar)  50 mg PO DAILY [facility]


   Last Admin: 03/27/18 08:32 Dose:  50 mg


Tramadol HCl (Ultram)  50 mg PO Q6 PRN


   PRN Reason: Pain, moderate (4-7)


   Last Admin: 03/25/18 21:22 Dose:  50 mg











- Labs


Labs: 


 





 03/26/18 04:20 





 03/26/18 04:20 





 











PT  11.7 Seconds (9.8-13.1)   03/26/18  04:20    


 


INR  1.1  (0.9-1.2)   03/26/18  04:20    


 


APTT  29.6 Seconds (25.6-37.1)  D 03/26/18  04:20    














Assessment and Plan


(1) Cellulitis of right lower extremity


Status: Acute   





(2) Deep vein thrombosis (DVT) of right lower extremity


Status: Acute   





(3) HTN (hypertension)


Status: Chronic   





(4) Anxiety


Status: Chronic   





(5) Status post total hip replacement, right


Status: Chronic   





(6) History of arterial embolism


Status: Chronic   





(7) Diabetes mellitus


Status: Chronic   





(8) Hyperglycemia


Status: Acute

## 2018-03-27 NOTE — CP.PCM.PN
Subjective





- Date & Time of Evaluation


Date of Evaluation: 03/27/18


Time of Evaluation: 12:00





- Subjective


Subjective: 





Podiatry Progress Note- Dr. Codrero





83 y.o female with PMH of HTN, DM, anxiety with DVT and improving cellulitis 

with tinea pedis seen and evaluated at bedside.Patient is seen resting 

comfortably in bed, in NAD, and AA0x3. Denies acute overnight events. Denies 

nausea, fever, shortness of breath, chest pain, chills or fever. Denies calf 

pain or tenderness. Patient reports that the burning pain is present. Reports 

pain has improved as well as the swelling. 





Objective





- Vital Signs/Intake and Output


Vital Signs (last 24 hours): 


 











Temp Pulse Resp BP Pulse Ox


 


 98 F   66   18   151/70 H  100 


 


 03/27/18 11:57  03/27/18 11:57  03/27/18 11:57  03/27/18 11:57  03/27/18 11:57











- Medications


Medications: 


 Current Medications





Acetaminophen (Tylenol 325mg Tab)  650 mg PO Q4 PRN


   PRN Reason: Pain, Mild (1-3)


   Last Admin: 03/25/18 04:25 Dose:  650 mg


Alprazolam (Xanax)  0.5 mg PO Q12 [facility]


   Stop: 03/31/18 21:01


   Last Admin: 03/27/18 08:34 Dose:  Not Given


Apixaban (Eliquis)  5 mg PO BID@0500,1700 [facility]


   PRN Reason: Protocol


   Last Admin: 03/27/18 04:38 Dose:  5 mg


Aspirin (Ecotrin)  81 mg PO DAILY [facility]


   Last Admin: 03/27/18 08:33 Dose:  81 mg


Carvedilol (Coreg)  3.125 mg PO BID [facility]


   Last Admin: 03/27/18 08:32 Dose:  3.125 mg


Clotrimazole (Lotrimin 1% Cream)  1 applic TOP BID [facility]


   Last Admin: 03/26/18 17:25 Dose:  1 applic


Dicyclomine HCl (Bentyl)  10 mg PO TID PRN


   PRN Reason: Other


Fluconazole (Diflucan)  100 mg PO DAILY [facility]


   PRN Reason: Protocol


   Stop: 03/28/18 11:00


   Last Admin: 03/27/18 08:32 Dose:  100 mg


Cefazolin Sodium 1 gm/ Sodium (Chloride)  100 mls @ 100 mls/hr IVPB Q8H [facility]


   PRN Reason: Protocol


   Last Admin: 03/27/18 08:29 Dose:  100 mls/hr


Insulin Detemir (Levemir)  8 units SC HS [facility]


   Last Admin: 03/26/18 22:05 Dose:  Not Given


Insulin Human Lispro (Humalog)  8 units SC AC [facility]


   Last Admin: 03/27/18 08:33 Dose:  Not Given


Insulin Human Lispro (Humalog)  0 units SC ACHS [facility]


   Last Admin: 03/27/18 06:31 Dose:  Not Given


Losartan Potassium (Cozaar)  50 mg PO DAILY [facility]


   Last Admin: 03/27/18 08:32 Dose:  50 mg


Tramadol HCl (Ultram)  50 mg PO Q6 PRN


   PRN Reason: Pain, moderate (4-7)


   Last Admin: 03/25/18 21:22 Dose:  50 mg











- Labs


Labs: 


 





 03/26/18 04:20 





 03/26/18 04:20 





 











PT  11.7 Seconds (9.8-13.1)   03/26/18  04:20    


 


INR  1.1  (0.9-1.2)   03/26/18  04:20    


 


APTT  29.6 Seconds (25.6-37.1)  D 03/26/18  04:20    














- Constitutional


Appears: Well, Non-toxic, No Acute Distress





- Extremities Exam


Extremities Exam: absent: Calf Tenderness


Additional comments: 








Vasc: DP and PT weakly palpable, temperature gradient WNL, no increase warmth 

noted to the right LE, CFT delayed x10 seconds


Ortho: severe pain with palpation to the foot below the ankle, no pain with 

palpation to the calf


Neuro: gross and protective sensation diminished


Derm: calluses at the plantar heel measuring approximately 1 x 1 cm, no 

underlying ulceration appreciated, no open lesions


erythema noted to the entire right foot below the ankle, mild erythema noted to 

the entire right LE-improved


scaly circular lesions noted to the bilaterally foot








- Neurological Exam


Neurological Exam: Alert, Awake, Oriented x3





- Psychiatric Exam


Psychiatric exam: Normal Affect, Normal Mood





Assessment and Plan





- Assessment and Plan (Free Text)


Assessment: 





83 y.o female with PMH of HTN, DM, anxiety with DVT and improving cellulitis 

with tinea pedis seen and evaluated at bedside.


Plan: 








Patient examined and evaluated


Discussed plan in detail with attending Dr. Cordero


Labs, charts, vitals reviewed


U/S reviewed- DVT of right LE


DVT treatment per primary team


Foot X-ray no fracture noted, cystic changes noted to proximal right 3rd 

metatarsal


f/u uric acid


Lotrimin for tinea pedis


c/w abx for cellulitis


Patient stable per podiatry standpoint with resolving cellulitis


Patient can follow up with Dr. Cordero as an outpatient within 1 week upon 

discharge


Please call to make an appointment

## 2018-03-27 NOTE — CON
DATE:



ENDOCRINOLOGY CONSULTATION



LOCATION:  In room 404.



HISTORY OF PRESENT ILLNESS:  This is an 83-year-old female with known

history of type 2 insulin-requiring diabetes, presenting here with

increasing right lower extremity pain and paresthesias and evaluated to

have a right foot cellulitis and and currently undergoing vascular workup

and is being referred now for diabetic evaluation because of glycemic

fluctuations despite the current insulin regimen as ordered.



PAST MEDICAL HISTORY:  As mentioned above, history of type 2

insulin-requiring diabetes on a triple insulin regimen with NovoLog given

as 12 units t.i.d. before meals with no basal insulin given overnight as

noted.  History of hypertensive cardiovascular disease and dyslipidemia,

history of diabetic retinopathy and polyneuropathy with peripheral arterial

disease and vasculopathy as noted.  She also has a previous cerebrovascular

event with acute CVA in 2013 with residual right upper extremity

hemipareses and weakness.  She also had a prior left upper extremity

subclavian embolus with a subsequent embolectomy done in 2016.  She also

has severe lumbar disk disease with previous epidural injections to the

lower back and she also has a known history of osteoarthritis and has a

prior right total hip replacement as noted thereof.  She is currently on

oral anticoagulant therapy as given.



FAMILY HISTORY:  Positive for diabetes and hypertension.



SOCIAL HISTORY:  The patient has supportive family with no known substance

use.



REVIEW OF SYSTEMS:  As mentioned above.  Admits to generalized body

weakness with easy fatigability and tiredness and suboptimal energy level. 

Also admits to episodic dizziness and lightheadedness, worse on the day of

admission.  No chest pains or palpitations or PND.  Her oral intake has

been variable with nausea and dyspepsia and vague upper abdominal pain. 

Extremities showed she has lower extremity painful neuropathic kind of pain

with painful paresthesias.  There is tenderness and swelling in the dorsum

of the right foot.



PHYSICAL EXAMINATION:

GENERAL:  Average built female in no apparent distress.

VITAL SIGNS:  The blood pressure of 140/80, pulse of 70 beats per minute

and regular, temperature 98, respirations 20, height is 5 feet 5 inches,

weight is 150 pounds.

HEENT:  Head normocephalic.  Eyes anicteric with pink conjunctivae. 

Funduscopy not possible at this time.  Ears and throat otherwise normal.

NECK:  Supple.  Thyroid gland is normal in size.  No carotid bruits or

cervical adenopathy.

CARDIOPULMONARY:  Adynamic precordium.  S1, S2 is rapid and regular.

LUNGS:  Clear to auscultation.

ABDOMEN:  Flat, soft with positive bowel sounds.

EXTREMITIES:  Pulses are +2 bilaterally.  The dorsum of the right foot

shows erythema and edema as noted thereof.



LABORATORY:  Her chemistry showed a BUN of 19, sodium 137, potassium 4.3,

chloride 104, CO2 25, glucose 233 and creatinine 0.8.  Her glucose levels

have ranged from 149-234 mg/dL.



ASSESSMENT

This is an 83-year-old female with uncontrolled and decompensated type 2

insulin-requiring diabetes with recent glycemic fluctuations as expected

from the intercurrent right foot cellulitis, which will contribute the

increased insulin resistance and further impaired glucose tolerance

thereof.  She also has diabetic microvascular complications of the

retinopathy and polyneuropathy with diabetic macrovascular complications of

cerebrovascular disease, coronary artery disease and peripheral arterial

disease and vasculopathy.  Presenting here with the right lower extremity

and right foot cellulitis.



PLAN OF MANAGEMENT:  As discussed with the patient and staff.  We will

modify the current insulin regimen and actually switch over to a more

physiologic basal and bolus insulin drug combination as ordered.  We will

start her with a lower dose of Humalog given as 8 units subcutaneous t.i.d.

before meals to start lunchtime today as ordered.  We will also add basal

insulin, but very low dose of Levemir given as 6 units subcutaneous at

bedtime daily to start tonight.  We will modify the coverage scale to

obviate hypoglycemia and detailed orders have been given.  A hemoglobin A1c

will be done _____ already ordered to confirm her prior poor glycemic

control.  We will obtain serial chemistries and supplement accordingly as

needed.  We will follow.





__________________________________________

Berta Jesus MD



DD:  03/26/2018 15:53:12

DT:  03/26/2018 15:57:51

Job # [id number]

## 2018-03-27 NOTE — PN
DATE:



ENDO FOLLOWUP NOTE



LOCATION:  Room 402.



SUBJECTIVE:  This is an 83-year-old female with recent uncontrolled type 2

insulin-requiring diabetes, now being followed closely for metabolic

management.  Her glycemic levels are fluctuating, but improved and the

glucose values overnight have ranged from 108-134 and 218 mg/dL.  However,

at noon time, her glucose levels jeanette to 332 mg/dL.  Her latest chemistry

showed BUN of 19, sodium of 137, potassium of 4.3, chloride of 104, CO2 of

25, glucose of 233, and creatinine of 0.8.  So at this time, we will modify

once again her basal and bolus insulin regimen and increase the Levemir to

8 units subcu at bedtime daily to start tonight.  We will also increase her

Humalog to 12 units subcu t.i.d. before meals as ordered.  She will follow

with  _____, her primary physician for outpatient diabetic and medical

management.  We will follow.





__________________________________________

Berta Jesus MD





DD:  03/27/2018 15:35:44

DT:  03/27/2018 15:37:10

Job # [id number]

## 2018-03-28 VITALS — RESPIRATION RATE: 20 BRPM

## 2018-03-28 LAB
B2 GLYCOPROT1 IGA SER-ACNC: <9 SAU (ref ?–20)
B2 GLYCOPROT1 IGG SER-ACNC: <9 SGU (ref ?–20)
B2 GLYCOPROT1 IGM SER-ACNC: <9 SMU (ref ?–20)
CARDIOLIPIN IGA SER IA-ACNC: <11 APL (ref ?–11)
CARDIOLIPIN IGG SER IA-ACNC: <14 GPL (ref ?–14)
CARDIOLIPIN IGM SER IA-ACNC: <12 MPL (ref ?–12)
PHOSPHATIDYLSERINE AB IGG: <10 U/ML (ref ?–10)
PS IGA SER-ACNC: <20 U/ML (ref ?–20)
PS IGM SER-ACNC: <25 U/ML (ref ?–25)

## 2018-03-28 PROCEDURE — F07L6FZ THERAPEUTIC EXERCISE TREATMENT OF MUSCULOSKELETAL SYSTEM - LOWER BACK / LOWER EXTREMITY USING ASSISTIVE, ADAPTIVE, SUPPORTIVE OR PROTECTIVE EQUIPMENT: ICD-10-PCS | Performed by: INTERNAL MEDICINE

## 2018-03-28 RX ADMIN — INSULIN DETEMIR SCH UNITS: 100 INJECTION, SOLUTION SUBCUTANEOUS at 22:19

## 2018-03-28 RX ADMIN — WATER SCH MLS/HR: 1 INJECTION INTRAMUSCULAR; INTRAVENOUS; SUBCUTANEOUS at 18:28

## 2018-03-28 RX ADMIN — INSULIN LISPRO SCH U: 100 INJECTION, SOLUTION INTRAVENOUS; SUBCUTANEOUS at 11:55

## 2018-03-28 RX ADMIN — INSULIN LISPRO SCH U: 100 INJECTION, SOLUTION INTRAVENOUS; SUBCUTANEOUS at 16:16

## 2018-03-28 RX ADMIN — INSULIN LISPRO SCH U: 100 INJECTION, SOLUTION INTRAVENOUS; SUBCUTANEOUS at 07:40

## 2018-03-28 NOTE — CP.PCM.HP
History of Present Illness





- History of Present Illness


History of Present Illness: 





82 y/o F, with previous admission to [facility] on 0324/18, due to R foot 

Cellulitis, RLE DVT.  On 03/27/18, Pt was transferred to TCU unit to continue 

abx treatment and  PT, OT.


Pt c/o of pain in R foot.





Present on Admission





- Present on Admission


Any Indicators Present on Admission: Yes


History of DVT/PE: Yes





Review of Systems





- Constitutional


Constitutional: Weakness (LUE)





- EENT


Eyes: Other (negative)


Ears: Other (negative)


Nose/Mouth/Throat: Other





- Cardiovascular


Cardiovascular: Other (negative)





- Respiratory


Respiratory: Other (negative)





- Gastrointestinal


Gastrointestinal: Other (negative)





- Genitourinary


Genitourinary: Other (negative)





- Musculoskeletal


Musculoskeletal: Arthralgias, Other (RLE, R foot)





- Integumentary


Integumentary: Wounds (healing)





- Neurological


Neurological: Weakness (LUE)





- Psychiatric


Psychiatric: Anxiety





- Endocrine


Endocrine: Other (negative)





- Hematologic/Lymphatic


Hematologic: Other (negative)





Past Patient History





- Infectious Disease


Hx of Infectious Diseases: None





- Tetanus Immunizations


Tetanus Immunization: Unknown





- Past Medical History & Family History


Past Medical History?: Yes


Pertinent Family History: 


Son: O/A, TKR





- Past Social History


Smoking Status: Never Smoked


Alcohol: None


Drugs: Denies


Home Situation {Lives}: With Family





- CARDIAC


Hx Cardiac Disorders: Yes


Hx Hypercholesterolemia: Yes


Hx Hypertension: Yes





- PULMONARY


Hx Respiratory Disorders: Yes


Other/Comment: Hx  Allergic  Rhinitis





- NEUROLOGICAL


Hx Neurological Disorder: Yes


HX Cerebrovascular Accident: Yes





- HEENT


Hx HEENT Problems: Yes


Hx Cataracts: Yes


Other/Comment: Allergic  Rhinitis





- RENAL


Hx Chronic Kidney Disease: No


Other/Comment: Recurrent UTI's





- ENDOCRINE/METABOLIC


Hx Endocrine Disorders: Yes


Hx Diabetes Mellitus Type 2: Yes





- HEMATOLOGICAL/ONCOLOGICAL


Hx Blood Disorders: Yes (DVT)


Other/Comment: Arterial embolus LUE with embolectomy L subclavian 2016





- INTEGUMENTARY


Hx Dermatological Problems: No





- MUSCULOSKELETAL/RHEUMATOLOGICAL


Hx Musculoskeletal Disorders: Yes


Hx Arthritis: Yes





- GASTROINTESTINAL


Hx Gastrointestinal Disorders: Yes


Hx Gastritis: Yes (Hiatus  Hernia)





- GENITOURINARY/GYNECOLOGICAL


Hx Genitourinary Disorders: Yes (UTI recurrent)





- PSYCHIATRIC


Hx Psychophysiologic Disorder: Yes


Hx Anxiety: Yes


Hx Substance Use: No





- SURGICAL HISTORY


Hx Cholecystectomy: Yes


Hx Orthopedic Surgery: Yes (Right THR)


Other/Comment: ALISSA  Arterial Embolectomy 2016





- ANESTHESIA


Hx Anesthesia: Yes


Hx Anesthesia Reactions: No


Hx Malignant Hyperthermia: No


Has any member of the family had a problem w/ anesthesia?: No





Meds


Allergies/Adverse Reactions: 


 Allergies











Allergy/AdvReac Type Severity Reaction Status Date / Time


 


No Known Allergies Allergy   Verified 03/24/18 09:55














Physical Exam





- Constitutional


Appears: No Acute Distress, Chronically Ill





- Head Exam


Head Exam: NORMAL INSPECTION





- Eye Exam


Eye Exam: PERRL





- ENT Exam


ENT Exam: Normal Exam





- Neck Exam


Neck exam: Positive for: Normal Inspection





- Respiratory Exam


Respiratory Exam: NORMAL BREATHING PATTERN





- Cardiovascular Exam


Cardiovascular Exam: REGULAR RHYTHM, Systolic Murmur (1/6 LSB)





- GI/Abdominal Exam


GI & Abdominal Exam: Normal Bowel Sounds, Soft





- Extremities Exam


Additional comments: 





Redness/tenderness R distal foot and toe, healed ulcers R-L heels.





- Back Exam


Additional comments: 





mild redness on sacrum





- Neurological Exam


Neurological exam: Alert, CN II-XII Intact, Oriented x3


Additional comments: 





Weakness LUE, rest no focal motor/sensory deficit.





- Psychiatric Exam


Psychiatric exam: Anxious





- Skin


Skin Exam: Warm





Results





- Vital Signs


Recent Vital Signs: 





 Last Vital Signs











Temp  97.5 F L  03/28/18 07:55


 


Pulse  74   03/28/18 09:08


 


Resp  20   03/28/18 07:55


 


BP  148/78   03/28/18 09:08


 


Pulse Ox  98   03/28/18 09:08








reviewed J.P.





- Labs


Labs: 





 Laboratory Results - last 24 hr











  03/27/18 03/27/18 03/28/18





  16:33 20:43 06:13


 


POC Glucose (mg/dL)  213 H  244 H  249 H














  03/28/18





  10:52


 


POC Glucose (mg/dL)  178 H








reviewed J.P.





Assessment & Plan


(1) Deep vein thrombosis (DVT) of right lower extremity


Status: Acute   Priority: High   





(2) Cellulitis of foot


Status: Acute   Priority: High   





(3) Hyperglycemia


Status: Acute   Priority: Medium   





(4) Diabetes mellitus


Status: Chronic   Priority: High   





(5) HTN (hypertension)


Status: Chronic   Priority: Medium   





(6) Status post total hip replacement, right


Status: Chronic   Priority: Medium   





(7) Anxiety


Status: Chronic   Priority: Medium   





- Assessment and Plan (Free Text)


Plan: 


Worsening and redness of R food today, start Vanco  Zosyn, and continue rest of 

Tx.








- Date & Time


Date: 03/28/18


Time: 13:00

## 2018-03-28 NOTE — CP.PCM.CON
History of Present Illness





- History of Present Illness


History of Present Illness: 








Podiatry Consult Note- Dr. Cordero





83 y.o female with PMH of HTN, DM, anxiety seen and evaluated for painful right 

foot. Patient was seen at [facility] prior to being transferred to TCU for continued 

treatment of right leg DVT. Patient complains of right burning pain. Reports 

that the pain has somewhat improves during hospital stay. Redness and swelling 

has improves. Still concern about the burning pain. Denies calf pain or 

tenderness. Denies chest pain, chills, fever, shortness of breath, or vomiting. 





PMH:  HTN, DM, anxiety, L arm blood clot, mini stroke left hand


PSH: gall bladder removal, R hip replacement


MEDS: see medication list


ALL: NKDA -reports does not feel well with cortisol


FH-mom DM


SH: denies smoking, drinking or illicit drug use





Past Patient History





- Infectious Disease


Hx of Infectious Diseases: None





- Tetanus Immunizations


Tetanus Immunization: Unknown





- Past Medical History & Family History


Past Medical History?: Yes





- Past Social History


Smoking Status: Never Smoked





- CARDIAC


Hx Hypercholesterolemia: Yes


Hx Hypertension: Yes





- PULMONARY


Hx Respiratory Disorders: Yes


Other/Comment: Hx  Allergic  Rhinitis





- NEUROLOGICAL


HX Cerebrovascular Accident: Yes





- HEENT


Hx HEENT Problems: Yes


Hx Cataracts: Yes


Other/Comment: Allergic  Rhinitis





- RENAL


Hx Chronic Kidney Disease: No


Other/Comment: Recurrent UTI's





- ENDOCRINE/METABOLIC


Hx Diabetes Mellitus Type 2: Yes





- HEMATOLOGICAL/ONCOLOGICAL


Hx Blood Disorders: Yes (DVT)


Other/Comment: Arterial embolus LUE with embolectomy L subclavian 2016





- INTEGUMENTARY


Hx Dermatological Problems: No





- MUSCULOSKELETAL/RHEUMATOLOGICAL


Hx Arthritis: Yes





- GASTROINTESTINAL


Hx Gastritis: Yes (Hiatus  Hernia)





- GENITOURINARY/GYNECOLOGICAL


Hx Genitourinary Disorders: Yes (UTI recurrent)





- PSYCHIATRIC


Hx Anxiety: Yes


Hx Substance Use: No





- SURGICAL HISTORY


Hx Cholecystectomy: Yes


Hx Orthopedic Surgery: Yes (Right THR)


Other/Comment: LUE  Arterial Embolectomy 2016





- ANESTHESIA


Hx Anesthesia: Yes


Hx Anesthesia Reactions: No


Hx Malignant Hyperthermia: No


Has any member of the family had a problem w/ anesthesia?: No





Meds


Allergies/Adverse Reactions: 


 Allergies











Allergy/AdvReac Type Severity Reaction Status Date / Time


 


No Known Allergies Allergy   Verified 03/24/18 09:55














- Medications


Medications: 


 Current Medications





Alprazolam (Xanax)  0.5 mg PO Q12 [facility]


   Last Admin: 03/28/18 08:49 Dose:  0.5 mg


Apixaban (Eliquis)  5 mg PO BID@0500,1700 [facility]


   PRN Reason: Protocol


   Last Admin: 03/28/18 04:27 Dose:  5 mg


Aspirin (Ecotrin)  81 mg PO DAILY [facility]


   Last Admin: 03/28/18 08:47 Dose:  81 mg


Carvedilol (Coreg)  3.125 mg PO BID [facility]


   Last Admin: 03/28/18 08:45 Dose:  3.125 mg


Clotrimazole (Lotrimin 1% Cream)  1 applic TOP BID [facility]


   Last Admin: 03/28/18 08:46 Dose:  1 units


Dicyclomine HCl (Bentyl)  10 mg PO TID PRN


   PRN Reason: Other


Fluconazole (Diflucan)  100 mg PO DAILY [facility]


   PRN Reason: Protocol


   Last Admin: 03/28/18 08:42 Dose:  100 mg


Cefazolin Sodium 1 gm/ Sodium (Chloride)  100 mls @ 100 mls/hr IVPB Q8 [facility]


   Last Admin: 03/28/18 08:42 Dose:  100 mls/hr


Insulin Detemir (Levemir)  8 units SC HS [facility]


   Last Admin: 03/27/18 21:09 Dose:  8 units


Insulin Human Lispro (Humalog)  12 units SC TIDAC [facility]


   Last Admin: 03/28/18 07:40 Dose:  12 u


Lidocaine (Lidoderm)  1 ea TD DAILY [facility]


Losartan Potassium (Cozaar)  50 mg PO DAILY [facility]


   Last Admin: 03/28/18 08:44 Dose:  50 mg


Tramadol HCl (Ultram)  50 mg PO Q6 PRN


   PRN Reason: Pain, moderate (4-7)











Physical Exam





- Constitutional


Appears: Well, Non-toxic, No Acute Distress





- Extremities Exam


Extremities exam: Negative for: calf tenderness


Additional comments: 








Vasc: DP and PT weakly palpable, temperature gradient WNL, no increase warmth 

noted to the right LE, CFT delayed x10 seconds


Ortho: moderate pain with palpation to the foot below the ankle, no pain with 

palpation to the calf


Neuro: gross and protective sensation diminished


Derm: calluses at the plantar heel measuring approximately 1 x 1 cm, no 

underlying ulceration appreciated, no open lesions


erythema noted to the entire right foot below the ankle, mild erythema noted to 

the entire right LE


scaly circular lesions noted to the bilaterally foot


improved redness


improved swelling





Results





- Vital Signs


Recent Vital Signs: 


 Last Vital Signs











Temp  97.5 F L  03/28/18 07:55


 


Pulse  74   03/28/18 09:08


 


Resp  20   03/28/18 07:55


 


BP  148/78   03/28/18 09:08


 


Pulse Ox  98   03/28/18 09:08














- Labs


Labs: 


 Laboratory Results - last 24 hr











  03/27/18 03/27/18 03/28/18





  16:33 20:43 06:13


 


POC Glucose (mg/dL)  213 H  244 H  249 H














Assessment & Plan





- Assessment and Plan (Free Text)


Assessment: 








83 y.o female with PMH of HTN, DM, anxiety with DVT and improving cellulitis 

with tinea pedis seen and evaluated at bedside


Plan: 








Patient examined and evaluated


Discussed plan in detail with attending Dr. Cordero


Labs, charts, vitals reviewed


U/S reviewed- DVT of right LE


DVT treatment per primary team


Foot X-ray no fracture noted, cystic changes noted to proximal right 3rd 

metatarsal


f/u uric acid


Lotrimin for tinea pedis


c/w abx for cellulitis


Patient stable per podiatry standpoint with resolving cellulitis


Patient can follow up with Dr. Cordero as an outpatient within 1 week upon 

discharge


Patient can be further workup as an outpatient for burning pain if still 

persist after discharge


Please call to make an appointment

## 2018-03-29 LAB
COLLECT DURATION TIME UR: 24 HRS
URATE 24H UR-MRATE: 518.4 MG/24HR (ref 250–750)
URATE UR-MCNC: 19.2 MG/DL
URINE TOTAL VOLUME: 2700 ML

## 2018-03-29 RX ADMIN — INSULIN LISPRO SCH U: 100 INJECTION, SOLUTION INTRAVENOUS; SUBCUTANEOUS at 07:20

## 2018-03-29 RX ADMIN — WATER SCH MLS/HR: 1 INJECTION INTRAMUSCULAR; INTRAVENOUS; SUBCUTANEOUS at 17:23

## 2018-03-29 RX ADMIN — WATER SCH MLS/HR: 1 INJECTION INTRAMUSCULAR; INTRAVENOUS; SUBCUTANEOUS at 00:24

## 2018-03-29 RX ADMIN — WATER SCH MLS/HR: 1 INJECTION INTRAMUSCULAR; INTRAVENOUS; SUBCUTANEOUS at 11:51

## 2018-03-29 RX ADMIN — INSULIN LISPRO SCH U: 100 INJECTION, SOLUTION INTRAVENOUS; SUBCUTANEOUS at 11:52

## 2018-03-29 RX ADMIN — INSULIN DETEMIR SCH UNITS: 100 INJECTION, SOLUTION SUBCUTANEOUS at 21:31

## 2018-03-29 RX ADMIN — WATER SCH MLS/HR: 1 INJECTION INTRAMUSCULAR; INTRAVENOUS; SUBCUTANEOUS at 05:00

## 2018-03-29 RX ADMIN — INSULIN LISPRO SCH U: 100 INJECTION, SOLUTION INTRAVENOUS; SUBCUTANEOUS at 17:19

## 2018-03-29 NOTE — CP.PCM.PN
Subjective





- Date & Time of Evaluation


Date of Evaluation: 03/29/18


Time of Evaluation: 13:45





- Subjective


Subjective: 





F/U  DVT, Foot Cellulitis.








Pain in R foot.





Objective





- Vital Signs/Intake and Output


Vital Signs (last 24 hours): 


 











Temp Pulse Resp BP Pulse Ox


 


 97.7 F   79   20   154/63 H  97 


 


 03/29/18 17:15  03/29/18 17:24  03/29/18 17:15  03/29/18 17:24  03/29/18 17:15











- Medications


Medications: 


 Current Medications





Acetaminophen (Tylenol 325mg Tab)  650 mg PO Q4 PRN


   PRN Reason: Pain, moderate (4-7)


   Last Admin: 03/28/18 19:21 Dose:  650 mg


Alprazolam (Xanax)  0.5 mg PO Q12 [facility]


   Last Admin: 03/29/18 08:46 Dose:  0.5 mg


Apixaban (Eliquis)  5 mg PO BID@0500,1700 [facility]


   PRN Reason: Protocol


   Last Admin: 03/29/18 17:18 Dose:  5 mg


Aspirin (Ecotrin)  81 mg PO DAILY [facility]


   Last Admin: 03/29/18 08:42 Dose:  81 mg


Carvedilol (Coreg)  3.125 mg PO BID [facility]


   Last Admin: 03/29/18 17:24 Dose:  3.125 mg


Clotrimazole (Lotrimin 1% Cream)  1 applic TOP BID [facility]


   Last Admin: 03/29/18 17:20 Dose:  1 units


Dicyclomine HCl (Bentyl)  10 mg PO TID PRN


   PRN Reason: Other


Fluconazole (Diflucan)  100 mg PO DAILY [facility]


   PRN Reason: Protocol


   Last Admin: 03/29/18 08:42 Dose:  100 mg


Vancomycin HCl 1 gm/ Sodium (Chloride)  250 mls @ 166.667 mls/hr IVPB Q12@0500,

1700 [facility]


   PRN Reason: Protocol


   Stop: 04/04/18 17:01


   Last Admin: 03/29/18 17:22 Dose:  166.667 mls/hr


Piperacillin Sod/Tazobactam (Sod 3.375 gm/ Sodium Chloride)  100 mls @ 100 mls/

hr IVPB 0600,1200,1800,0000 [facility]


   PRN Reason: Protocol


   Stop: 04/04/18 18:01


   Last Admin: 03/29/18 17:23 Dose:  100 mls/hr


Insulin Detemir (Levemir)  8 units SC HS [facility]


   Last Admin: 03/28/18 22:19 Dose:  8 units


Insulin Human Lispro (Humalog)  12 units SC TIDAC [facility]


   Last Admin: 03/29/18 17:19 Dose:  12 u


Lidocaine (Lidoderm)  1 ea TD DAILY [facility]


   Last Admin: 03/29/18 08:44 Dose:  1 ea


Losartan Potassium (Cozaar)  50 mg PO DAILY [facility]


   Last Admin: 03/29/18 08:42 Dose:  50 mg


Tramadol HCl (Ultram)  50 mg PO Q6 PRN


   PRN Reason: Pain, moderate (4-7)











- Constitutional


Appears: Chronically Ill





- Head Exam


Head Exam: NORMAL INSPECTION





- Eye Exam


Eye Exam: PERRL





- ENT Exam


ENT Exam: Normal Exam





- Neck Exam


Neck Exam: Normal Inspection





- Respiratory Exam


Respiratory Exam: NORMAL BREATHING PATTERN





- Cardiovascular Exam


Cardiovascular Exam: REGULAR RHYTHM, Murmur (systolic 1/6 LSB)





- GI/Abdominal Exam


GI & Abdominal Exam: Soft, Normal Bowel Sounds





- Extremities Exam


Extremities Exam: Tenderness (and redness distal dorsum R foot and toes, healed 

ulcers R-L heel)





- Back Exam


Back Exam: NORMAL INSPECTION





- Neurological Exam


Neurological Exam: Alert, CN II-XII Intact, Oriented x3


Additional comments: 





Weakness LUE, rest no focal motor/sensory deficit.





- Psychiatric Exam


Psychiatric exam: Anxious





- Skin


Skin Exam: Warm





Assessment and Plan


(1) Deep vein thrombosis (DVT) of right lower extremity


Status: Acute   





(2) Cellulitis of foot


Status: Acute   





(3) Hyperglycemia


Status: Acute   





(4) Diabetes mellitus


Status: Chronic   





(5) HTN (hypertension)


Status: Chronic   





(6) Status post total hip replacement, right


Status: Chronic   





(7) Anxiety


Status: Chronic   





- Assessment and Plan (Free Text)


Plan: 





Continue Zosyn, Vanco, rest of Tx, PT,O.

## 2018-03-30 RX ADMIN — INSULIN LISPRO SCH U: 100 INJECTION, SOLUTION INTRAVENOUS; SUBCUTANEOUS at 16:33

## 2018-03-30 RX ADMIN — WATER SCH MLS/HR: 1 INJECTION INTRAMUSCULAR; INTRAVENOUS; SUBCUTANEOUS at 06:45

## 2018-03-30 RX ADMIN — WATER SCH MLS/HR: 1 INJECTION INTRAMUSCULAR; INTRAVENOUS; SUBCUTANEOUS at 17:31

## 2018-03-30 RX ADMIN — INSULIN LISPRO SCH U: 100 INJECTION, SOLUTION INTRAVENOUS; SUBCUTANEOUS at 07:08

## 2018-03-30 RX ADMIN — INSULIN DETEMIR SCH UNITS: 100 INJECTION, SOLUTION SUBCUTANEOUS at 22:06

## 2018-03-30 RX ADMIN — INSULIN LISPRO SCH U: 100 INJECTION, SOLUTION INTRAVENOUS; SUBCUTANEOUS at 12:29

## 2018-03-30 RX ADMIN — WATER SCH MLS/HR: 1 INJECTION INTRAMUSCULAR; INTRAVENOUS; SUBCUTANEOUS at 12:33

## 2018-03-30 RX ADMIN — WATER SCH MLS/HR: 1 INJECTION INTRAMUSCULAR; INTRAVENOUS; SUBCUTANEOUS at 00:12

## 2018-03-30 NOTE — CP.PCM.CON
History of Present Illness





- History of Present Illness


History of Present Illness: 





82 yo F with pmhx of htn, DM, anxiety currently admitted for right foot 

cellulitis and RLE DVT presents s/p RTHR in 12/2017. Orthopaedics consulted for 

rigth hip evaluation and THR compatibility with right foot MRI. pt denies any 

current right hip pain. Pt c/o right foot pain and erythema. Pt denies any SOB, 

chest pain, N/V/D, fever/chills, right hip wound drainage. 





Past Patient History





- Infectious Disease


Hx of Infectious Diseases: None





- Tetanus Immunizations


Tetanus Immunization: Unknown





- Past Medical History & Family History


Past Medical History?: Yes





- Past Social History


Smoking Status: Never Smoked


Alcohol: None


Drugs: Denies


Home Situation {Lives}: With Family





- CARDIAC


Hx Cardiac Disorders: Yes


Hx Hypercholesterolemia: Yes


Hx Hypertension: Yes





- PULMONARY


Hx Respiratory Disorders: Yes


Other/Comment: Hx  Allergic  Rhinitis





- NEUROLOGICAL


Hx Neurological Disorder: Yes


HX Cerebrovascular Accident: Yes





- HEENT


Hx HEENT Problems: Yes


Hx Cataracts: Yes


Other/Comment: Allergic  Rhinitis





- RENAL


Hx Chronic Kidney Disease: No


Other/Comment: Recurrent UTI's





- ENDOCRINE/METABOLIC


Hx Endocrine Disorders: Yes


Hx Diabetes Mellitus Type 2: Yes





- HEMATOLOGICAL/ONCOLOGICAL


Hx Blood Disorders: Yes (DVT)


Other/Comment: Arterial embolus LUE with embolectomy L subclavian 2016





- INTEGUMENTARY


Hx Dermatological Problems: No





- MUSCULOSKELETAL/RHEUMATOLOGICAL


Hx Musculoskeletal Disorders: Yes


Hx Arthritis: Yes





- GASTROINTESTINAL


Hx Gastrointestinal Disorders: Yes


Hx Gastritis: Yes (Hiatus  Hernia)





- GENITOURINARY/GYNECOLOGICAL


Hx Genitourinary Disorders: Yes (UTI recurrent)





- PSYCHIATRIC


Hx Psychophysiologic Disorder: Yes


Hx Anxiety: Yes


Hx Substance Use: No





- SURGICAL HISTORY


Hx Cholecystectomy: Yes


Hx Orthopedic Surgery: Yes (Right THR)


Other/Comment: LUE  Arterial Embolectomy 2016





- ANESTHESIA


Hx Anesthesia: Yes


Hx Anesthesia Reactions: No


Hx Malignant Hyperthermia: No


Has any member of the family had a problem w/ anesthesia?: No





Meds


Allergies/Adverse Reactions: 


 Allergies











Allergy/AdvReac Type Severity Reaction Status Date / Time


 


No Known Allergies Allergy   Verified 03/24/18 09:55














- Medications


Medications: 


 Current Medications





Acetaminophen (Tylenol 325mg Tab)  650 mg PO Q4 PRN


   PRN Reason: Pain, moderate (4-7)


   Last Admin: 03/28/18 19:21 Dose:  650 mg


Alprazolam (Xanax)  0.5 mg PO Q12 [facility]


   Last Admin: 03/30/18 09:05 Dose:  0.5 mg


Apixaban (Eliquis)  5 mg PO BID@0500,1700 [facility]


   PRN Reason: Protocol


   Last Admin: 03/30/18 05:00 Dose:  5 mg


Aspirin (Ecotrin)  81 mg PO DAILY [facility]


   Last Admin: 03/30/18 09:08 Dose:  81 mg


Carvedilol (Coreg)  3.125 mg PO BID [facility]


   Last Admin: 03/30/18 09:05 Dose:  3.125 mg


Clotrimazole (Lotrimin 1% Cream)  1 applic TOP BID [facility]


   Last Admin: 03/30/18 09:00 Dose:  1 applic


Dicyclomine HCl (Bentyl)  10 mg PO TID PRN


   PRN Reason: Other


   Last Admin: 03/30/18 10:44 Dose:  10 mg


Fluconazole (Diflucan)  100 mg PO DAILY [facility]


   PRN Reason: Protocol


   Last Admin: 03/30/18 09:07 Dose:  100 mg


Vancomycin HCl 1 gm/ Sodium (Chloride)  250 mls @ 166.667 mls/hr IVPB Q12@0500,

1700 [facility]


   PRN Reason: Protocol


   Stop: 04/04/18 17:01


   Last Admin: 03/30/18 04:45 Dose:  166.667 mls/hr


Piperacillin Sod/Tazobactam (Sod 3.375 gm/ Sodium Chloride)  100 mls @ 100 mls/

hr IVPB 0600,1200,1800,0000 [facility]


   PRN Reason: Protocol


   Stop: 04/04/18 18:01


   Last Admin: 03/30/18 12:33 Dose:  100 mls/hr


Insulin Detemir (Levemir)  8 units SC HS [facility]


   Last Admin: 03/29/18 21:31 Dose:  8 units


Insulin Human Lispro (Humalog)  12 units SC TIDAC [facility]


   Last Admin: 03/30/18 12:29 Dose:  12 u


Lidocaine (Lidoderm)  1 ea TD DAILY [facility]


   Last Admin: 03/30/18 09:08 Dose:  1 ea


Losartan Potassium (Cozaar)  50 mg PO DAILY [facility]


   Last Admin: 03/30/18 09:07 Dose:  50 mg


Tramadol HCl (Ultram)  50 mg PO Q6 PRN


   PRN Reason: Pain, moderate (4-7)











Physical Exam





- Constitutional


Appears: Well, No Acute Distress





- Respiratory Exam


Respiratory Exam: Clear to Auscultation Bilateral, NORMAL BREATHING PATTERN





- Cardiovascular Exam


Cardiovascular Exam: REGULAR RHYTHM, RRR





- Extremities Exam


Additional comments: 





RLE:


right hip incision C/D/I


+erythema to dorsal right foot 


N/V intact distally


mild ttp right calf


Distal pulses wnl 








Results





- Vital Signs


Recent Vital Signs: 


 Last Vital Signs











Temp  97.7 F   03/30/18 09:05


 


Pulse  76   03/30/18 09:07


 


Resp  20   03/30/18 09:05


 


BP  183/76 H  03/30/18 09:07


 


Pulse Ox  97   03/30/18 09:05














- Labs


Labs: 


 Laboratory Results - last 24 hr











  03/28/18 03/29/18 03/29/18





  10:00 10:54 15:43


 


POC Glucose (mg/dL)   258 H  97


 


Urine Collection Time  24  


 


Urine Total Volume  2700  


 


Ur Uric Acid 24 Hr  [phone]  














  03/29/18 03/30/18 03/30/18





  21:06 05:57 10:52


 


POC Glucose (mg/dL)  173 H  255 H  208 H


 


Urine Collection Time   


 


Urine Total Volume   


 


Ur Uric Acid 24 Hr   














Assessment & Plan





- Assessment and Plan (Free Text)


Assessment: 





82 yo F with pmhx of htn, DM, anxiety currently admitted for right foot 

cellulitis and RLE DVT presents s/p RTHR in 12/2017


Plan: 





Pt cleared to have MRI right foot s/p RTHR 


Right hip- continue PT/OT WBAT - follow weight bearing restrictions as per 

podiatry


Pt can f/u as outpt regarding her right hip


Continue current management as per primary team

## 2018-03-30 NOTE — CP.PCM.PN
Subjective





- Date & Time of Evaluation


Date of Evaluation: 03/30/18





- Subjective


Subjective: 





F/U  DVT, R foot cellulitis.





Pt continue with pain in R foot.





Objective





- Vital Signs/Intake and Output


Vital Signs (last 24 hours): 


 











Temp Pulse Resp BP Pulse Ox


 


 97.7 F   76   20   183/76 H  97 


 


 03/30/18 09:05  03/30/18 09:07  03/30/18 09:05  03/30/18 09:07  03/30/18 09:05











- Medications


Medications: 


 Current Medications





Acetaminophen (Tylenol 325mg Tab)  650 mg PO Q4 PRN


   PRN Reason: Pain, moderate (4-7)


   Last Admin: 03/28/18 19:21 Dose:  650 mg


Alprazolam (Xanax)  0.5 mg PO Q12 [facility]


   Last Admin: 03/30/18 09:05 Dose:  0.5 mg


Apixaban (Eliquis)  5 mg PO BID@0500,1700 [facility]


   PRN Reason: Protocol


   Last Admin: 03/30/18 05:00 Dose:  5 mg


Aspirin (Ecotrin)  81 mg PO DAILY [facility]


   Last Admin: 03/30/18 09:08 Dose:  81 mg


Carvedilol (Coreg)  3.125 mg PO BID [facility]


   Last Admin: 03/30/18 09:05 Dose:  3.125 mg


Clotrimazole (Lotrimin 1% Cream)  1 applic TOP BID [facility]


   Last Admin: 03/30/18 09:00 Dose:  1 applic


Dicyclomine HCl (Bentyl)  10 mg PO TID PRN


   PRN Reason: Other


   Last Admin: 03/30/18 10:44 Dose:  10 mg


Fluconazole (Diflucan)  100 mg PO DAILY [facility]


   PRN Reason: Protocol


   Last Admin: 03/30/18 09:07 Dose:  100 mg


Vancomycin HCl 1 gm/ Sodium (Chloride)  250 mls @ 166.667 mls/hr IVPB Q12@0500,

1700 [facility]


   PRN Reason: Protocol


   Stop: 04/04/18 17:01


   Last Admin: 03/30/18 04:45 Dose:  166.667 mls/hr


Piperacillin Sod/Tazobactam (Sod 3.375 gm/ Sodium Chloride)  100 mls @ 100 mls/

hr IVPB 0600,1200,1800,0000 [facility]


   PRN Reason: Protocol


   Stop: 04/04/18 18:01


   Last Admin: 03/30/18 12:33 Dose:  100 mls/hr


Insulin Detemir (Levemir)  8 units SC HS [facility]


   Last Admin: 03/29/18 21:31 Dose:  8 units


Insulin Human Lispro (Humalog)  12 units SC TIDAC [facility]


   Last Admin: 03/30/18 12:29 Dose:  12 u


Lidocaine (Lidoderm)  1 ea TD DAILY [facility]


   Last Admin: 03/30/18 09:08 Dose:  1 ea


Losartan Potassium (Cozaar)  50 mg PO DAILY [facility]


   Last Admin: 03/30/18 09:07 Dose:  50 mg


Tramadol HCl (Ultram)  50 mg PO Q6 PRN


   PRN Reason: Pain, moderate (4-7)











- Constitutional


Appears: Chronically Ill





- Head Exam


Head Exam: NORMAL INSPECTION





- Eye Exam


Eye Exam: PERRL





- ENT Exam


ENT Exam: Normal Exam





- Neck Exam


Neck Exam: Normal Inspection





- Respiratory Exam


Respiratory Exam: NORMAL BREATHING PATTERN





- Cardiovascular Exam


Cardiovascular Exam: REGULAR RHYTHM, Murmur (systolic 1/6 LSB)





- GI/Abdominal Exam


GI & Abdominal Exam: Soft, Normal Bowel Sounds





- Extremities Exam


Additional comments: 





Tenderness and redness distal dorsum R foot, healed ulcers R-L heel





Assessment and Plan


(1) Deep vein thrombosis (DVT) of right lower extremity


Status: Acute   





(2) Cellulitis of foot


Status: Acute   





(3) Hyperglycemia


Status: Acute   





(4) Diabetes mellitus


Status: Chronic   





(5) HTN (hypertension)


Status: Chronic   





(6) Status post total hip replacement, right


Status: Chronic   





(7) Anxiety


Status: Chronic   





- Assessment and Plan (Free Text)


Plan: 


MRI of the R foot if R THR compatible to MRI. Continue Vanco, Zosyn and rest of 

Tx.

## 2018-03-31 RX ADMIN — INSULIN DETEMIR SCH UNITS: 100 INJECTION, SOLUTION SUBCUTANEOUS at 21:11

## 2018-03-31 RX ADMIN — WATER SCH MLS/HR: 1 INJECTION INTRAMUSCULAR; INTRAVENOUS; SUBCUTANEOUS at 00:13

## 2018-03-31 RX ADMIN — WATER SCH MLS/HR: 1 INJECTION INTRAMUSCULAR; INTRAVENOUS; SUBCUTANEOUS at 23:30

## 2018-03-31 RX ADMIN — WATER SCH MLS/HR: 1 INJECTION INTRAMUSCULAR; INTRAVENOUS; SUBCUTANEOUS at 11:37

## 2018-03-31 RX ADMIN — WATER SCH MLS/HR: 1 INJECTION INTRAMUSCULAR; INTRAVENOUS; SUBCUTANEOUS at 17:27

## 2018-03-31 RX ADMIN — INSULIN LISPRO SCH U: 100 INJECTION, SOLUTION INTRAVENOUS; SUBCUTANEOUS at 11:36

## 2018-03-31 RX ADMIN — WATER SCH MLS/HR: 1 INJECTION INTRAMUSCULAR; INTRAVENOUS; SUBCUTANEOUS at 06:47

## 2018-03-31 RX ADMIN — INSULIN LISPRO SCH U: 100 INJECTION, SOLUTION INTRAVENOUS; SUBCUTANEOUS at 16:55

## 2018-03-31 RX ADMIN — INSULIN LISPRO SCH U: 100 INJECTION, SOLUTION INTRAVENOUS; SUBCUTANEOUS at 06:52

## 2018-03-31 NOTE — CP.PCM.PN
Subjective





- Date & Time of Evaluation


Date of Evaluation: 03/31/18





- Subjective


Subjective: 





F/U DVT/Cellulitis R foot





Pt c/o of pain in R foot but less than yesterday.





Objective





- Vital Signs/Intake and Output


Vital Signs (last 24 hours): 


 











Temp Pulse Resp BP Pulse Ox


 


 97.0 F L  61   20   169/59 H  99 


 


 03/31/18 08:06  03/31/18 08:35  03/31/18 08:06  03/31/18 08:35  03/31/18 08:06











- Medications


Medications: 


 Current Medications





Acetaminophen (Tylenol 325mg Tab)  650 mg PO Q4 PRN


   PRN Reason: Pain, moderate (4-7)


   Last Admin: 03/28/18 19:21 Dose:  650 mg


Alprazolam (Xanax)  0.5 mg PO Q12 [facility]


   Last Admin: 03/31/18 08:42 Dose:  0.5 mg


Apixaban (Eliquis)  5 mg PO BID@0500,1700 [facility]


   PRN Reason: Protocol


   Last Admin: 03/31/18 05:47 Dose:  5 mg


Aspirin (Ecotrin)  81 mg PO DAILY [facility]


   Last Admin: 03/31/18 08:36 Dose:  81 mg


Carvedilol (Coreg)  3.125 mg PO BID [facility]


   Last Admin: 03/31/18 08:35 Dose:  3.125 mg


Clotrimazole (Lotrimin 1% Cream)  1 applic TOP BID [facility]


   Last Admin: 03/31/18 08:36 Dose:  1 applic


Dicyclomine HCl (Bentyl)  10 mg PO TID PRN


   PRN Reason: Other


   Last Admin: 03/30/18 10:44 Dose:  10 mg


Fluconazole (Diflucan)  100 mg PO DAILY [facility]


   PRN Reason: Protocol


   Last Admin: 03/31/18 08:36 Dose:  100 mg


Vancomycin HCl 1 gm/ Sodium (Chloride)  250 mls @ 166.667 mls/hr IVPB Q12@0500,

1700 [facility]


   PRN Reason: Protocol


   Stop: 04/04/18 17:01


   Last Admin: 03/31/18 04:38 Dose:  166.667 mls/hr


Piperacillin Sod/Tazobactam (Sod 3.375 gm/ Sodium Chloride)  100 mls @ 100 mls/

hr IVPB 0600,1200,1800,0000 [facility]


   PRN Reason: Protocol


   Stop: 04/04/18 18:01


   Last Admin: 03/31/18 11:37 Dose:  100 mls/hr


Insulin Detemir (Levemir)  8 units SC HS [facility]


   Last Admin: 03/30/18 22:06 Dose:  8 units


Insulin Human Lispro (Humalog)  12 units SC TIDAC [facility]


   Last Admin: 03/31/18 11:36 Dose:  12 u


Lidocaine (Lidoderm)  1 ea TD DAILY [facility]


   Last Admin: 03/31/18 08:36 Dose:  1 ea


Losartan Potassium (Cozaar)  50 mg PO DAILY [facility]


   Last Admin: 03/31/18 08:35 Dose:  50 mg


Tramadol HCl (Ultram)  50 mg PO Q6 PRN


   PRN Reason: Pain, moderate (4-7)


   Last Admin: 03/31/18 00:19 Dose:  50 mg











- Constitutional


Appears: Chronically Ill





- Head Exam


Head Exam: NORMAL INSPECTION





- Eye Exam


Eye Exam: PERRL





- ENT Exam


ENT Exam: Normal Exam





- Neck Exam


Neck Exam: Normal Inspection





- Respiratory Exam


Respiratory Exam: NORMAL BREATHING PATTERN





- Cardiovascular Exam


Cardiovascular Exam: REGULAR RHYTHM, Murmur (systolic 1/6 LSB)





- GI/Abdominal Exam


GI & Abdominal Exam: Soft, Normal Bowel Sounds





- Extremities Exam


Extremities Exam: Tenderness (and redness distal dorsum R foot, R hip C/D/I, 

dry scab on heels)





- Back Exam


Back Exam: NORMAL INSPECTION





- Neurological Exam


Neurological Exam: Alert, Oriented x3


Additional comments: 





Weakness LUE, rest no focal motor/sensory deficit.





- Psychiatric Exam


Psychiatric exam: Anxious





- Skin


Skin Exam: Warm





Assessment and Plan


(1) Deep vein thrombosis (DVT) of right lower extremity


Status: Acute   





(2) Cellulitis of foot


Status: Acute   





(3) Hyperglycemia


Status: Acute   





(4) Diabetes mellitus


Status: Chronic   





(5) HTN (hypertension)


Status: Chronic   





(6) Status post total hip replacement, right


Status: Chronic   





(7) Anxiety


Status: Chronic   





- Assessment and Plan (Free Text)


Plan: 





Pt seen by Orthopedic yesterday and cleared to have MRI R foot s/p THR.  

Continue Vanco , Zosyn and rest of Tx.

## 2018-04-01 RX ADMIN — WATER SCH MLS/HR: 1 INJECTION INTRAMUSCULAR; INTRAVENOUS; SUBCUTANEOUS at 12:13

## 2018-04-01 RX ADMIN — INSULIN LISPRO SCH U: 100 INJECTION, SOLUTION INTRAVENOUS; SUBCUTANEOUS at 07:48

## 2018-04-01 RX ADMIN — INSULIN LISPRO SCH U: 100 INJECTION, SOLUTION INTRAVENOUS; SUBCUTANEOUS at 12:19

## 2018-04-01 RX ADMIN — WATER SCH MLS/HR: 1 INJECTION INTRAMUSCULAR; INTRAVENOUS; SUBCUTANEOUS at 23:47

## 2018-04-01 RX ADMIN — WATER SCH MLS/HR: 1 INJECTION INTRAMUSCULAR; INTRAVENOUS; SUBCUTANEOUS at 06:48

## 2018-04-01 RX ADMIN — INSULIN LISPRO SCH U: 100 INJECTION, SOLUTION INTRAVENOUS; SUBCUTANEOUS at 16:30

## 2018-04-01 RX ADMIN — WATER SCH MLS/HR: 1 INJECTION INTRAMUSCULAR; INTRAVENOUS; SUBCUTANEOUS at 17:52

## 2018-04-01 RX ADMIN — INSULIN DETEMIR SCH UNITS: 100 INJECTION, SOLUTION SUBCUTANEOUS at 21:39

## 2018-04-02 RX ADMIN — WATER SCH MLS/HR: 1 INJECTION INTRAMUSCULAR; INTRAVENOUS; SUBCUTANEOUS at 12:24

## 2018-04-02 RX ADMIN — INSULIN LISPRO SCH: 100 INJECTION, SOLUTION INTRAVENOUS; SUBCUTANEOUS at 17:13

## 2018-04-02 RX ADMIN — INSULIN LISPRO SCH U: 100 INJECTION, SOLUTION INTRAVENOUS; SUBCUTANEOUS at 09:06

## 2018-04-02 RX ADMIN — WATER SCH MLS/HR: 1 INJECTION INTRAMUSCULAR; INTRAVENOUS; SUBCUTANEOUS at 17:15

## 2018-04-02 RX ADMIN — WATER SCH MLS/HR: 1 INJECTION INTRAMUSCULAR; INTRAVENOUS; SUBCUTANEOUS at 05:31

## 2018-04-02 RX ADMIN — INSULIN LISPRO SCH U: 100 INJECTION, SOLUTION INTRAVENOUS; SUBCUTANEOUS at 12:25

## 2018-04-02 RX ADMIN — INSULIN DETEMIR SCH UNITS: 100 INJECTION, SOLUTION SUBCUTANEOUS at 21:41

## 2018-04-02 NOTE — CP.PCM.PN
Subjective





- Date & Time of Evaluation


Date of Evaluation: 04/02/18





- Subjective


Subjective: 





F/U DVT/ Cellulitis R foot.


Pain  R foot  improved





Objective





- Vital Signs/Intake and Output


Vital Signs (last 24 hours): 


 











Temp Pulse Resp BP Pulse Ox


 


 97.0 F L  65   20   151/62 H  100 


 


 04/02/18 16:41  04/02/18 16:41  04/02/18 16:41  04/02/18 16:41  04/02/18 16:41











- Medications


Medications: 


 Current Medications





Acetaminophen (Tylenol 325mg Tab)  650 mg PO Q4 PRN


   PRN Reason: Pain, moderate (4-7)


   Last Admin: 03/28/18 19:21 Dose:  650 mg


Apixaban (Eliquis)  5 mg PO BID@0500,1700 [facility]


   PRN Reason: Protocol


   Last Admin: 04/02/18 05:31 Dose:  5 mg


Aspirin (Ecotrin)  81 mg PO DAILY [facility]


   Last Admin: 04/02/18 09:06 Dose:  81 mg


Carvedilol (Coreg)  3.125 mg PO BID [facility]


   Last Admin: 04/02/18 09:06 Dose:  3.125 mg


Clotrimazole (Lotrimin 1% Cream)  1 applic TOP BID [facility]


   Last Admin: 04/02/18 09:04 Dose:  1 applic


Dicyclomine HCl (Bentyl)  10 mg PO TID PRN


   PRN Reason: Other


   Last Admin: 04/02/18 09:06 Dose:  10 mg


Piperacillin Sod/Tazobactam (Sod 3.375 gm/ Sodium Chloride)  100 mls @ 100 mls/

hr IVPB 0600,1200,1800,0000 [facility]


   PRN Reason: Protocol


   Stop: 04/04/18 18:01


   Last Admin: 04/02/18 12:24 Dose:  100 mls/hr


Insulin Detemir (Levemir)  8 units SC HS [facility]


   Last Admin: 04/01/18 21:39 Dose:  8 units


Insulin Human Lispro (Humalog)  12 units SC TIDAC [facility]


   Last Admin: 04/02/18 12:25 Dose:  12 u


Lidocaine (Lidoderm)  1 ea TD DAILY [facility]


   Last Admin: 04/02/18 09:04 Dose:  1 ea


Losartan Potassium (Cozaar)  50 mg PO DAILY [facility]


   Last Admin: 04/02/18 09:06 Dose:  50 mg


Tramadol HCl (Ultram)  50 mg PO Q6 PRN


   PRN Reason: Pain, moderate (4-7)


   Last Admin: 04/01/18 17:59 Dose:  50 mg











- Constitutional


Appears: Chronically Ill





- Head Exam


Head Exam: NORMAL INSPECTION





- Eye Exam


Eye Exam: PERRL





- ENT Exam


ENT Exam: Normal Exam





- Neck Exam


Neck Exam: Normal Inspection





- Respiratory Exam


Respiratory Exam: Decreased Breath Sounds





- Cardiovascular Exam


Cardiovascular Exam: REGULAR RHYTHM, Murmur (systolic 1/6 LSB)





- GI/Abdominal Exam


GI & Abdominal Exam: Soft, Normal Bowel Sounds





- Extremities Exam


Extremities Exam: Tenderness (tenderness and  redness greatly  decreased distal 

dorsum R foot, dry scab on heels)





- Back Exam


Back Exam: NORMAL INSPECTION





- Neurological Exam


Neurological Exam: Alert, CN II-XII Intact, Oriented x3


Additional comments: 





Weakness LUE, rest of  extremities no focal motor/sensory deficit.





- Psychiatric Exam


Psychiatric exam: Anxious





- Skin


Skin Exam: Warm





Assessment and Plan


(1) Deep vein thrombosis (DVT) of right lower extremity


Status: Acute   





(2) Cellulitis of foot


Status: Acute   





(3) Hyperglycemia


Status: Acute   





(4) Diabetes mellitus


Status: Chronic   





(5) HTN (hypertension)


Status: Chronic   





(6) Status post total hip replacement, right


Status: Chronic   





(7) Anxiety


Status: Chronic   





- Assessment and Plan (Free Text)


Plan: 





continue  Vanco , Zosyn , Eliquis  and  rest  of  treatment , f/u MRI R foot

## 2018-04-02 NOTE — CP.PCM.PN
Subjective





- Date & Time of Evaluation


Date of Evaluation: 04/02/18


Time of Evaluation: 08:22





Objective





- Vital Signs/Intake and Output


Vital Signs (last 24 hours): 


 











Temp Pulse Resp BP Pulse Ox


 


 97.9 F   70   20   165/71 H  98 


 


 04/01/18 22:07  04/01/18 22:07  04/01/18 22:07  04/01/18 22:07  04/01/18 22:07











- Medications


Medications: 


 Current Medications





Acetaminophen (Tylenol 325mg Tab)  650 mg PO Q4 PRN


   PRN Reason: Pain, moderate (4-7)


   Last Admin: 03/28/18 19:21 Dose:  650 mg


Alprazolam (Xanax)  0.5 mg PO Q12 [facility]


   Last Admin: 04/01/18 21:36 Dose:  0.5 mg


Apixaban (Eliquis)  5 mg PO BID@0500,1700 [facility]


   PRN Reason: Protocol


   Last Admin: 04/02/18 05:31 Dose:  5 mg


Aspirin (Ecotrin)  81 mg PO DAILY [facility]


   Last Admin: 04/01/18 08:45 Dose:  81 mg


Carvedilol (Coreg)  3.125 mg PO BID [facility]


   Last Admin: 04/01/18 17:46 Dose:  3.125 mg


Clotrimazole (Lotrimin 1% Cream)  1 applic TOP BID [facility]


   Last Admin: 04/01/18 17:49 Dose:  1 applic


Dicyclomine HCl (Bentyl)  10 mg PO TID [facility]N


   PRN Reason: Other


   Last Admin: 04/01/18 08:49 Dose:  10 mg


Fluconazole (Diflucan)  100 mg PO DAILY [facility]


   PRN Reason: Protocol


   Last Admin: 04/01/18 08:43 Dose:  100 mg


Vancomycin HCl 1 gm/ Sodium (Chloride)  250 mls @ 166.667 mls/hr IVPB Q12@0500,

1700 [facility]


   PRN Reason: Protocol


   Stop: 04/04/18 17:01


   Last Admin: 04/02/18 05:37 Dose:  166.667 mls/hr


Piperacillin Sod/Tazobactam (Sod 3.375 gm/ Sodium Chloride)  100 mls @ 100 mls/

hr IVPB 0600,1200,1800,0000 [facility]


   PRN Reason: Protocol


   Stop: 04/04/18 18:01


   Last Admin: 04/02/18 05:31 Dose:  100 mls/hr


Insulin Detemir (Levemir)  8 units SC HS [facility]


   Last Admin: 04/01/18 21:39 Dose:  8 units


Insulin Human Lispro (Humalog)  12 units SC TIDAC [facility]


   Last Admin: 04/01/18 16:30 Dose:  12 u


Lidocaine (Lidoderm)  1 ea TD DAILY [facility]


   Last Admin: 04/01/18 08:40 Dose:  1 ea


Losartan Potassium (Cozaar)  50 mg PO DAILY [facility]


   Last Admin: 04/01/18 08:44 Dose:  50 mg


Tramadol HCl (Ultram)  50 mg PO Q6 PRN


   PRN Reason: Pain, moderate (4-7)


   Last Admin: 04/01/18 17:59 Dose:  50 mg

## 2018-04-02 NOTE — CP.PCM.PN
Subjective





- Date & Time of Evaluation


Date of Evaluation: 04/02/18


Time of Evaluation: 08:14





- Subjective


Subjective: 


Podiatry Consult Note- Dr. Cordero





83 y.o female seen and evaluated at bedside this morning for painful right 

foot. Reports that the pain has somewhat improves during hospital stay but it 

still occasionally burns near her big toe joint and on the bottom of the foot. 

Denies F/C/N/V/CP/SOB. Says her foot is less red today. 











Objective





- Vital Signs/Intake and Output


Vital Signs (last 24 hours): 


 











Temp Pulse Resp BP Pulse Ox


 


 97.9 F   70   20   165/71 H  98 


 


 04/01/18 22:07  04/01/18 22:07  04/01/18 22:07  04/01/18 22:07  04/01/18 22:07











- Medications


Medications: 


 Current Medications





Acetaminophen (Tylenol 325mg Tab)  650 mg PO Q4 PRN


   PRN Reason: Pain, moderate (4-7)


   Last Admin: 03/28/18 19:21 Dose:  650 mg


Alprazolam (Xanax)  0.5 mg PO Q12 [facility]


   Last Admin: 04/01/18 21:36 Dose:  0.5 mg


Apixaban (Eliquis)  5 mg PO BID@0500,1700 [facility]


   PRN Reason: Protocol


   Last Admin: 04/02/18 05:31 Dose:  5 mg


Aspirin (Ecotrin)  81 mg PO DAILY [facility]


   Last Admin: 04/01/18 08:45 Dose:  81 mg


Carvedilol (Coreg)  3.125 mg PO BID [facility]


   Last Admin: 04/01/18 17:46 Dose:  3.125 mg


Clotrimazole (Lotrimin 1% Cream)  1 applic TOP BID [facility]


   Last Admin: 04/01/18 17:49 Dose:  1 applic


Dicyclomine HCl (Bentyl)  10 mg PO TID PRN


   PRN Reason: Other


   Last Admin: 04/01/18 08:49 Dose:  10 mg


Fluconazole (Diflucan)  100 mg PO DAILY [facility]


   PRN Reason: Protocol


   Last Admin: 04/01/18 08:43 Dose:  100 mg


Vancomycin HCl 1 gm/ Sodium (Chloride)  250 mls @ 166.667 mls/hr IVPB Q12@0500,

1700 [facility]


   PRN Reason: Protocol


   Stop: 04/04/18 17:01


   Last Admin: 04/02/18 05:37 Dose:  166.667 mls/hr


Piperacillin Sod/Tazobactam (Sod 3.375 gm/ Sodium Chloride)  100 mls @ 100 mls/

hr IVPB 0600,1200,1800,0000 [facility]


   PRN Reason: Protocol


   Stop: 04/04/18 18:01


   Last Admin: 04/02/18 05:31 Dose:  100 mls/hr


Insulin Detemir (Levemir)  8 units SC HS [facility]


   Last Admin: 04/01/18 21:39 Dose:  8 units


Insulin Human Lispro (Humalog)  12 units SC TIDAC [facility]


   Last Admin: 04/01/18 16:30 Dose:  12 u


Lidocaine (Lidoderm)  1 ea TD DAILY [facility]


   Last Admin: 04/01/18 08:40 Dose:  1 ea


Losartan Potassium (Cozaar)  50 mg PO DAILY [facility]


   Last Admin: 04/01/18 08:44 Dose:  50 mg


Tramadol HCl (Ultram)  50 mg PO Q6 PRN


   PRN Reason: Pain, moderate (4-7)


   Last Admin: 04/01/18 17:59 Dose:  50 mg











- Constitutional


Appears: Well, Non-toxic, No Acute Distress





- Extremities Exam


Additional comments: 


RLE focused examination:





Vasc: DP and PT weakly palpable, temperature gradient WNL, no increase warmth 

noted to the right LE, CFT delayed but present x 10 digits


Ortho: moderate pain with palpation to the R foot below the ankle, no pain with 

palpation to the calf


Neuro: gross and protective sensation diminished


Derm: calluses at the plantar heel measuring approximately 1 x 1 cm, no 

underlying ulceration appreciated, no open lesions. Diffuse erythema noted to 

the entire right foot distal to ankle joint. Annular scaling noted to plantar 

foot B/L








- Neurological Exam


Neurological Exam: Alert, Awake, Oriented x3





- Psychiatric Exam


Psychiatric exam: Normal Affect, Normal Mood





Assessment and Plan





- Assessment and Plan (Free Text)


Assessment: 


83 y.o female with right lower extremity cellulitis with tinea pedis 


Plan: 








Patient examined and evaluated


Discussed plan in detail with attending Dr. Cordero


Labs, charts, vitals reviewed


U/S reviewed- DVT of right LE


Foot X-ray no fracture noted, cystic changes noted to proximal right 3rd 

metatarsal


24h uric acid WNL


MRI (-) for any acute osseous changes or OM


Continue Lotrimin lotion to B/L plantar feet for tinea pedis


Cont IV abx for cellulitis


Patient stable per podiatry standpoint with resolving cellulitis


Patient can follow up with Dr. Cordero as an outpatient within 1 week upon 

discharge

## 2018-04-03 RX ADMIN — WATER SCH MLS/HR: 1 INJECTION INTRAMUSCULAR; INTRAVENOUS; SUBCUTANEOUS at 09:43

## 2018-04-03 RX ADMIN — WATER SCH MLS/HR: 1 INJECTION INTRAMUSCULAR; INTRAVENOUS; SUBCUTANEOUS at 05:30

## 2018-04-03 RX ADMIN — INSULIN LISPRO SCH U: 100 INJECTION, SOLUTION INTRAVENOUS; SUBCUTANEOUS at 17:26

## 2018-04-03 RX ADMIN — INSULIN LISPRO SCH U: 100 INJECTION, SOLUTION INTRAVENOUS; SUBCUTANEOUS at 06:50

## 2018-04-03 RX ADMIN — WATER SCH MLS/HR: 1 INJECTION INTRAMUSCULAR; INTRAVENOUS; SUBCUTANEOUS at 21:04

## 2018-04-03 RX ADMIN — INSULIN LISPRO SCH U: 100 INJECTION, SOLUTION INTRAVENOUS; SUBCUTANEOUS at 11:49

## 2018-04-03 RX ADMIN — INSULIN DETEMIR SCH UNITS: 100 INJECTION, SOLUTION SUBCUTANEOUS at 21:06

## 2018-04-03 RX ADMIN — WATER SCH MLS/HR: 1 INJECTION INTRAMUSCULAR; INTRAVENOUS; SUBCUTANEOUS at 17:27

## 2018-04-03 NOTE — CP.PCM.PN
Subjective





- Date & Time of Evaluation


Date of Evaluation: 04/03/18


Time of Evaluation: 13:40





- Subjective


Subjective: 





pain   R foot  improved , ambulating  with  a  walker  with  PT





Objective





- Vital Signs/Intake and Output


Vital Signs (last 24 hours): 


 











Temp Pulse Resp BP Pulse Ox


 


 98.1 F   67   20   186/86 H  97 


 


 04/03/18 10:11  04/03/18 13:32  04/03/18 10:11  04/03/18 10:11  04/03/18 13:32











- Medications


Medications: 


 Current Medications





Acetaminophen (Tylenol 325mg Tab)  650 mg PO Q4 PRN


   PRN Reason: Pain, moderate (4-7)


   Last Admin: 03/28/18 19:21 Dose:  650 mg


Alprazolam (Xanax)  0.5 mg PO Q12 [facility]


   Last Admin: 04/03/18 08:51 Dose:  0.5 mg


Apixaban (Eliquis)  5 mg PO BID@0500,1700 [facility]


   PRN Reason: Protocol


   Last Admin: 04/03/18 05:30 Dose:  5 mg


Aspirin (Ecotrin)  81 mg PO DAILY [facility]


   Last Admin: 04/03/18 08:49 Dose:  81 mg


Carvedilol (Coreg)  3.125 mg PO BID [facility]


   Last Admin: 04/03/18 08:49 Dose:  3.125 mg


Clotrimazole (Lotrimin 1% Cream)  1 applic TOP BID [facility]


   Last Admin: 04/03/18 08:49 Dose:  1 applic


Dicyclomine HCl (Bentyl)  10 mg PO TID PRN


   PRN Reason: Other


   Last Admin: 04/03/18 08:48 Dose:  10 mg


Piperacillin Sod/Tazobactam (Sod 3.375 gm/ Sodium Chloride)  100 mls @ 100 mls/

hr IVPB Q6 [facility]


   PRN Reason: Protocol


   Last Admin: 04/03/18 09:43 Dose:  100 mls/hr


Vancomycin HCl 1 gm/ Sodium (Chloride)  250 mls @ 166.667 mls/hr IVPB Q12H [facility]


   PRN Reason: Protocol


   Last Admin: 04/03/18 06:35 Dose:  166.667 mls/hr


Insulin Detemir (Levemir)  8 units SC HS [facility]


   Last Admin: 04/02/18 21:41 Dose:  8 units


Insulin Human Lispro (Humalog)  12 units SC TIDAC [facility]


   Last Admin: 04/03/18 11:49 Dose:  12 u


Lidocaine (Lidoderm)  1 ea TD DAILY [facility]


   Last Admin: 04/03/18 08:49 Dose:  Not Given


Losartan Potassium (Cozaar)  50 mg PO DAILY [facility]


   Last Admin: 04/03/18 08:49 Dose:  50 mg


Tramadol HCl (Ultram)  50 mg PO Q6 PRN


   PRN Reason: Pain, moderate (4-7)


   Last Admin: 04/01/18 17:59 Dose:  50 mg











- Constitutional


Appears: No Acute Distress





- Head Exam


Head Exam: NORMAL INSPECTION





- Eye Exam


Eye Exam: PERRL





- ENT Exam


ENT Exam: Normal Exam





- Neck Exam


Neck Exam: Normal Inspection





- Respiratory Exam


Respiratory Exam: Clear to Ausculation Bilateral





- Cardiovascular Exam


Cardiovascular Exam: REGULAR RHYTHM





- GI/Abdominal Exam


GI & Abdominal Exam: Soft, Normal Bowel Sounds





- Extremities Exam


Additional comments: 





redness , tenderness greatly decreased distal  dorsum  R foot , heel mild  

tenderness





- Back Exam


Back Exam: NORMAL INSPECTION





- Neurological Exam


Neurological Exam: Alert, Awake, CN II-XII Intact


Additional comments: 





weakness  LUE





- Psychiatric Exam


Psychiatric exam: Anxious





- Skin


Skin Exam: Warm





Assessment and Plan


(1) Deep vein thrombosis (DVT) of right lower extremity


Status: Acute   





(2) Cellulitis of foot


Assessment & Plan: 


continue  Sybil Weemsn , PT


Status: Acute   





(3) Hyperglycemia


Status: Acute   





(4) Diabetes mellitus


Status: Chronic   





(5) HTN (hypertension)


Status: Chronic   





(6) Status post total hip replacement, right


Status: Chronic   





(7) Anxiety


Status: Chronic   





(8) History of CVA (cerebrovascular accident)


Status: Chronic   





- Assessment and Plan (Free Text)


Plan: 





Continue Vanco Zosyn, MRI R  foot reviewed  f/u  Podiatric  and ID  consult

## 2018-04-04 VITALS
HEART RATE: 70 BPM | SYSTOLIC BLOOD PRESSURE: 159 MMHG | DIASTOLIC BLOOD PRESSURE: 70 MMHG | TEMPERATURE: 97.4 F | OXYGEN SATURATION: 100 %

## 2018-04-04 RX ADMIN — INSULIN LISPRO SCH U: 100 INJECTION, SOLUTION INTRAVENOUS; SUBCUTANEOUS at 11:40

## 2018-04-04 RX ADMIN — WATER SCH MLS/HR: 1 INJECTION INTRAMUSCULAR; INTRAVENOUS; SUBCUTANEOUS at 05:00

## 2018-04-04 RX ADMIN — INSULIN LISPRO SCH U: 100 INJECTION, SOLUTION INTRAVENOUS; SUBCUTANEOUS at 08:32

## 2018-04-04 RX ADMIN — WATER SCH MLS/HR: 1 INJECTION INTRAMUSCULAR; INTRAVENOUS; SUBCUTANEOUS at 11:44

## 2018-04-04 NOTE — CP.PCM.CON
History of Present Illness





- History of Present Illness


History of Present Illness: 





Infectious Disease Consultation Note-





 asked to see this patient at the request of  for Right foot cellulitis.





HPI-


Patient is a pleasant 83 year old female with pmho f DM II, CVA, UE arterial 

thrombus,  who was admitted to [facility] on 3/24/2018 for right foot pain.


 as per medical notes-patietn had Right THR 12-19-17 [facility]  [place] , Patient  

was  [facility]fer[facility]  [facility] for  [facility]hab. and  DD home 1-3-18 , there  after

  at  home  She  developed ulcers  R L heel with  great  difficulty with  

Therapy  at  home ,  heels  ulcers were  treated  at  home with local care, 

Patient  was also   treated with   Augmentin for  C-S positive  the  ulcers 

gradually healed and according  to Patient  and  Patient's  daughter  at  

bedside  ulcers healed  earlier  this week , also She  took  Augmentin up to 

the week of admission to [facility].


patient has been seen by podiatry and per podiatry notes no open wounds or any 

discharge on this admission, only old dried right heel callus.


patient has been on keflex since this admission as per PMD as also found to 

have RLE DVT on this admission adn was treated with anticoagulants  for that by 

her PMD.


she is  switched to vanco and zosyn today by her PMD based on MRI result.


I'm now asked to see the patient because pmd wants to d/c patient home, however

, she had Right foot MRI on 3/31/2018 that did not report any osseous problems 

but was read as plantar subcutaneous soft tissue edema or cellulitis .


patient denies any fever or chills and explains the foot looks much better and 

the redness has resolved, she does mention, however, that she has pain in the 

plantar aspect of her right foot and states the foot gets red when she places 

it on the floor and not elevated.





Worsening symptoms:  Showing DVT RLE on Ext U-S.





Aggravated factor:  Movements/exercise.





Pt denied: Fall, fever, chills, n/v/d, abdominal pain, CP, palpitations, syncope

, SOB,cough, sick contact, recent travel out of [place]. 





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

, R THR, O/A , Lumbago with previous Epidural blocks





Allergy- NKDA








Review of Systems





- Review of Systems


Review of Systems: 





ROS-


denies any fever or chills, denies any HA, denies any cough, denies any sob, 

denies any chest pain, denies any abd. pain, denies any nausea or vomiting, 


denies any dyaurea, denies any diarrhea


c/o pian in the plantar aspect of her right foot but the open wound that she 

had has healed , no discharge, no edema.





Past Patient History





- Infectious Disease


Hx of Infectious Diseases: None





- Tetanus Immunizations


Tetanus Immunization: Unknown





- Past Medical History & Family History


Past Medical History?: Yes





- Past Social History


Smoking Status: Never Smoked


Alcohol: None


Drugs: Denies


Home Situation {Lives}: With Family





- CARDIAC


Hx Cardiac Disorders: Yes


Hx Hypercholesterolemia: Yes


Hx Hypertension: Yes





- PULMONARY


Hx Respiratory Disorders: Yes


Other/Comment: Hx  Allergic  Rhinitis





- NEUROLOGICAL


Hx Neurological Disorder: Yes


HX Cerebrovascular Accident: Yes





- HEENT


Hx HEENT Problems: Yes


Hx Cataracts: Yes


Other/Comment: Allergic  Rhinitis





- RENAL


Hx Chronic Kidney Disease: No


Other/Comment: Recurrent UTI's





- ENDOCRINE/METABOLIC


Hx Endocrine Disorders: Yes


Hx Diabetes Mellitus Type 2: Yes





- HEMATOLOGICAL/ONCOLOGICAL


Hx Blood Disorders: Yes (DVT)


Other/Comment: Arterial embolus LUE with embolectomy L subclavian 2016





- INTEGUMENTARY


Hx Dermatological Problems: No





- MUSCULOSKELETAL/RHEUMATOLOGICAL


Hx Musculoskeletal Disorders: Yes


Hx Arthritis: Yes





- GASTROINTESTINAL


Hx Gastrointestinal Disorders: Yes


Hx Gastritis: Yes (Hiatus  Hernia)





- GENITOURINARY/GYNECOLOGICAL


Hx Genitourinary Disorders: Yes (UTI recurrent)





- PSYCHIATRIC


Hx Psychophysiologic Disorder: Yes


Hx Anxiety: Yes


Hx Substance Use: No





- SURGICAL HISTORY


Hx Cholecystectomy: Yes


Hx Orthopedic Surgery: Yes (Right THR)


Other/Comment: LUE  Arterial Embolectomy 2016





- ANESTHESIA


Hx Anesthesia: Yes


Hx Anesthesia Reactions: No


Hx Malignant Hyperthermia: No


Has any member of the family had a problem w/ anesthesia?: No





Meds


Allergies/Adverse Reactions: 


 Allergies











Allergy/AdvReac Type Severity Reaction Status Date / Time


 


No Known Allergies Allergy   Verified 03/24/18 09:55














- Medications


Medications: 


 Current Medications





Acetaminophen (Tylenol 325mg Tab)  650 mg PO Q4 PRN


   PRN Reason: Pain, moderate (4-7)


   Last Admin: 03/28/18 19:21 Dose:  650 mg


Alprazolam (Xanax)  0.5 mg PO Q12 [facility]


   Last Admin: 04/04/18 08:37 Dose:  0.5 mg


Aspirin (Ecotrin)  81 mg PO DAILY [facility]


   Last Admin: 04/04/18 08:34 Dose:  81 mg


Carvedilol (Coreg)  3.125 mg PO BID [facility]


   Last Admin: 04/04/18 08:34 Dose:  3.125 mg


Clotrimazole (Lotrimin 1% Cream)  1 applic TOP BID [facility]


   Last Admin: 04/04/18 08:35 Dose:  1 applic


Dicyclomine HCl (Bentyl)  10 mg PO TID PRN


   PRN Reason: Other


   Last Admin: 04/03/18 08:48 Dose:  10 mg


Vancomycin HCl 1 gm/ Sodium (Chloride)  250 mls @ 166.667 mls/hr IVPB Q12@0500,

1700 [facility]


   PRN Reason: Protocol


   Last Admin: 04/04/18 04:21 Dose:  166.667 mls/hr


Piperacillin Sod/Tazobactam (Sod 3.375 gm/ Sodium Chloride)  100 mls @ 100 mls/

hr IVPB 0600,1200,1800,0000 [facility]


   PRN Reason: Protocol


   Last Admin: 04/04/18 05:00 Dose:  100 mls/hr


Insulin Detemir (Levemir)  8 units SC HS [facility]


   Last Admin: 04/03/18 21:06 Dose:  8 units


Insulin Human Lispro (Humalog)  12 units SC TIDAC [facility]


   Last Admin: 04/04/18 08:32 Dose:  12 u


Lidocaine (Lidoderm)  1 ea TD DAILY [facility]


   Last Admin: 04/04/18 08:42 Dose:  Not Given


Losartan Potassium (Cozaar)  50 mg PO DAILY [facility]


   Last Admin: 04/04/18 08:35 Dose:  50 mg


Tramadol HCl (Ultram)  50 mg PO Q6 PRN


   PRN Reason: Pain, moderate (4-7)


   Last Admin: 04/01/18 17:59 Dose:  50 mg











Physical Exam





- Constitutional


Appears: Non-toxic, No Acute Distress





- Head Exam


Head Exam: ATRAUMATIC





- Eye Exam


Eye Exam: EOMI





- ENT Exam


ENT Exam: Normal Oropharynx





- Neck Exam


Neck exam: Positive for: Full Rom





- Respiratory Exam


Respiratory Exam: Clear to Auscultation Bilateral, NORMAL BREATHING PATTERN





- Cardiovascular Exam


Cardiovascular Exam: RRR, +S1, +S2





- GI/Abdominal Exam


GI & Abdominal Exam: Normal Bowel Sounds, Soft


Additional comments: 





NT, ND





- Extremities Exam


Additional comments: 





no edema b/l LE


Right plantar heel with dried callus, no discharge, no escahr





no open ulcers anywhere on the right foot.


no erythema


no edema














- Neurological Exam


Neurological exam: Alert, Oriented x3





- Skin


Additional comments: 





right hip THR surgical site clean/dry/intact


no erythema





Results





- Vital Signs


Recent Vital Signs: 


 Last Vital Signs











Temp  97.4 F L  04/04/18 08:12


 


Pulse  70   04/04/18 08:35


 


Resp  20   04/04/18 08:12


 


BP  159/70 H  04/04/18 08:35


 


Pulse Ox  100   04/04/18 08:12














- Labs


Labs: 


 Laboratory Results - last 24 hr











  04/03/18 04/03/18 04/04/18





  15:53 20:39 05:56


 


POC Glucose (mg/dL)  119 H  193 H  179 H














  04/04/18





  11:00


 


POC Glucose (mg/dL)  291 H








 Laboratory Results - last 72 hr











  04/01/18 04/01/18 04/01/18





  10:52 16:42 21:02


 


POC Glucose (mg/dL)  253 H  130 H  117 H














  04/02/18 04/02/18 04/02/18





  06:32 11:25 16:21


 


POC Glucose (mg/dL)  209 H  221 H  69














  04/02/18 04/03/18 04/03/18





  20:54 06:11 11:16


 


POC Glucose (mg/dL)  187 H  172 H  202 H














  04/03/18 04/03/18 04/04/18





  15:53 20:39 05:56


 


POC Glucose (mg/dL)  119 H  193 H  179 H














  04/04/18





  11:00


 


POC Glucose (mg/dL)  291 H








 





 





Microbiology





03/24/18 14:01   Urine   Urine Culture - Final


                              MULTIPLE SPECIES. SUGGEST REPEAT SPECIMEN.


03/24/18 12:00   Blood   Blood Culture - Final


03/24/18 12:00   Blood   Gram Stain - Final


                              NO GROWTH AFTER 5 DAYS


                              TEST NOT PERFORMED


03/24/18 11:20   Blood   Blood Culture - Final


03/24/18 11:20   Blood   Gram Stain - Final


                              NO GROWTH AFTER 5 DAYS


                              TEST NOT PERFORMED





Accession No. : [id number]


Patient Name / ID : LG JOHNSON I  / [id number]


Exam Date : 03/31/2018 11:38:50 ( Approved )


Study Comment : 


Sex / Age : F  / 83Y





Creator : Amador Rodriguez MD


Dictator : Amador Rodriguez MD


 : 


Approver : Amador Rodriguez MD


Approver2 : 





Report Date : 04/02/2018 15:35:39


My Comment : 


********************************************************************************

***





MRI right hindfoot 





History: Cellulitis.  Evaluate for osteomyelitis. 





Comparison: X-ray dated 03/24/2018 





Technique: Multi-echo multiplanar sequences were performed through the right 

hindfoot without the use of intravenous contrast. 





Findings: 





Reticulation and edema within the circumferential subcutaneous soft tissues 

suggestive for an underlying cellulitis. 





Plantar subcutaneous soft tissue edema versus cellulitis. 





Anterior extensor tendons are preserved. 





Mild tenosynovitis of the posterior tibial tendon.  Remainder of the medial 

flexor tendons are preserved. 





Partial split tear of the peroneus brevis tendon at the level of the ankle 

mortise. 





Anterior and posterior tibiofibular ligaments are preserved. 





Anterior and posterior talofibular ligaments are preserved. 





Subchondral cyst formation seen at the bases of the 3rd and 4th metatarsal 

bones. 





3 millimeter subchondral cyst formation seen within the lateral aspect of the 

navicular bone. 





Trace ankle joint effusion. 





Mild distal Achilles tendinopathy.  Dorsal calcaneal spurring. 





Prominent plantar calcaneal spurring.  Thickening of the plantar fascia 

measuring up to 9 millimeters suggestive for a mild plantar fascitis. 





Mild signal abnormality of the sinus tarsi with decreased T1 signal and 

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 





Degenerative changes noted at dorsal aspect of the talonavicular joint space. 





Degenerative changes with subchondral cyst formation noted at the anterior 

process of the calcaneus. 





Deltoid ligament is preserved. 





Impression: 





1. Reticulation and edema within the circumferential subcutaneous soft tissues 

suggestive for an underlying cellulitis. 





2. Plantar subcutaneous soft tissue edema versus cellulitis. 





3. Mild tenosynovitis of the posterior tibial tendon.  Remainder of the medial 

flexor tendons are preserved. 





4. Partial split tear of the peroneus brevis tendon at the level of the ankle 

mortise. 





5. Subchondral cyst formation seen at the bases of the 3rd and 4th metatarsal 

bones. 





6. 3 millimeter subchondral cyst formation seen within the lateral aspect of 

the navicular bone. 





7. Trace ankle joint effusion. 





8. Mild distal Achilles tendinopathy.  Dorsal calcaneal spurring. 





9. Prominent plantar calcaneal spurring.  Thickening of the plantar fascia 

measuring up to 9 millimeters suggestive for a mild plantar fascitis. 





10. Mild signal abnormality of the sinus tarsi with decreased T1 signal and 

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 





11. Degenerative changes noted at dorsal aspect of the talonavicular joint 

space. 





12. Degenerative changes with subchondral cyst formation noted at the anterior 

process of the calcaneus. 





These findings were preliminarily reported at 1:12 p.m. on 03/31/2018 by Dr. Hunter Nichols from [facility].








Accession No. : [id number]


Patient Name / ID : LG JOHNSON I  / [id number]


Exam Date : 03/24/2018 11:41:39 ( Approved )


Study Comment : 


Sex / Age : F  / 83Y





Creator : Harvey Shaw MD


Dictator : 


 : 


Approver : Harvey Shaw MD


Approver2 : 





Report Date : 03/24/2018 13:14:15


My Comment : 


********************************************************************************

***





PROCEDURE:  Right lower extremity venous duplex Doppler. 





HISTORY:


r/o dvt











COMPARISON:


None available.





TECHNIQUE:


Common femoral, superficial femoral, popliteal and posterior tibial veins were 

evaluated. Flow was assessed with color Doppler, compressibility, assessment of 

phasic flow and augmentation response. 





FINDINGS:





COMMON FEMORAL VEIN:


Unremarkable.





SUPERFICIAL FEMORAL VEIN:


Unremarkable.





POPLITEAL VEIN:


There is noncompressible thrombus within the right popliteal and right 

posterior tibial veins.  





POSTERIOR TIBIAL VEIN:


Unremarkable.





OTHER FINDINGS:


None.





IMPRESSION:


Noncompressible thrombus within the right popliteal and right posterior tibial 

veins.

















Assessment & Plan


(1) History of CVA (cerebrovascular accident)


Status: Chronic   





(2) Cellulitis of right lower extremity


Status: Acute   Priority: High   





(3) Diabetes mellitus


Status: Chronic   Priority: High   





(4) History of arterial embolism


Status: Chronic   Priority: Medium   





- Assessment and Plan (Free Text)


Assessment: 





A/P-


83 year old female with multiple medical conditions including DM II, UE 

arterial thrombus, CVA and right THR who apparently had right foot open 

draining ulcers which have been treated as outpatient by local wound care and 

augmentin and no evidence of any open  wounds at this time.


Right foot cellulitis no longer present.





no evidence of OM on MRI report.


No evidence of cellulitis on exam today.


pt. most likely has some PVD in the LE.





plan-


advise to keep feet elevated while supine or sitting position.


advise to f/u with podiatry and pmd as outpatient  in 1 week.


callus care as per podiatry rec.


advise if patient is being d/c to d/c home on oral bactrim DS BID for 10-14 

days.


advised pt. if she develops any rash or allergy to immediately notify her pmd 

and stop the antibiotic.


DVT management as per pmd.


 








Pt. verbalizes full understanding of all above and agrees with above plan of 

care.





All above was also d/w  .





Thank you for allowing me to take part in the care of this patient.